# Patient Record
Sex: FEMALE | Race: WHITE | NOT HISPANIC OR LATINO | Employment: OTHER | URBAN - METROPOLITAN AREA
[De-identification: names, ages, dates, MRNs, and addresses within clinical notes are randomized per-mention and may not be internally consistent; named-entity substitution may affect disease eponyms.]

---

## 2017-07-12 ENCOUNTER — TRANSCRIBE ORDERS (OUTPATIENT)
Dept: ADMINISTRATIVE | Facility: HOSPITAL | Age: 77
End: 2017-07-12

## 2017-07-12 ENCOUNTER — HOSPITAL ENCOUNTER (OUTPATIENT)
Dept: GENERAL RADIOLOGY | Facility: HOSPITAL | Age: 77
Discharge: HOME | End: 2017-07-12
Admitting: NURSE PRACTITIONER

## 2017-07-12 DIAGNOSIS — G89.29 CHRONIC LEFT HIP PAIN: ICD-10-CM

## 2017-07-12 DIAGNOSIS — M25.552 CHRONIC LEFT HIP PAIN: ICD-10-CM

## 2017-07-12 DIAGNOSIS — M25.552 CHRONIC LEFT HIP PAIN: Primary | ICD-10-CM

## 2017-07-12 DIAGNOSIS — G89.29 CHRONIC LEFT HIP PAIN: Primary | ICD-10-CM

## 2017-07-12 PROCEDURE — 73502 X-RAY EXAM HIP UNI 2-3 VIEWS: CPT

## 2018-01-11 ENCOUNTER — TRANSCRIBE ORDERS (OUTPATIENT)
Dept: ADMINISTRATIVE | Facility: HOSPITAL | Age: 78
End: 2018-01-11

## 2018-01-11 ENCOUNTER — HOSPITAL ENCOUNTER (OUTPATIENT)
Dept: GENERAL RADIOLOGY | Facility: HOSPITAL | Age: 78
Discharge: HOME | End: 2018-01-11
Admitting: NURSE PRACTITIONER

## 2018-01-11 DIAGNOSIS — Z01.818 PREOP EXAMINATION: ICD-10-CM

## 2018-01-11 DIAGNOSIS — Z01.818 PREOP EXAMINATION: Primary | ICD-10-CM

## 2018-01-11 PROCEDURE — 71046 X-RAY EXAM CHEST 2 VIEWS: CPT

## 2019-04-02 ENCOUNTER — HOSPITAL ENCOUNTER (OUTPATIENT)
Dept: GENERAL RADIOLOGY | Facility: HOSPITAL | Age: 79
Discharge: HOME | End: 2019-04-02
Admitting: INTERNAL MEDICINE

## 2019-04-02 ENCOUNTER — TRANSCRIBE ORDERS (OUTPATIENT)
Dept: ADMINISTRATIVE | Facility: HOSPITAL | Age: 79
End: 2019-04-02

## 2019-04-02 DIAGNOSIS — M25.812 MASS OF JOINT OF LEFT SHOULDER: Primary | ICD-10-CM

## 2019-04-02 PROCEDURE — 73030 X-RAY EXAM OF SHOULDER: CPT

## 2022-04-18 ENCOUNTER — HOSPITAL ENCOUNTER (OUTPATIENT)
Dept: GENERAL RADIOLOGY | Facility: HOSPITAL | Age: 82
Discharge: HOME | End: 2022-04-18
Admitting: FAMILY MEDICINE

## 2022-04-18 ENCOUNTER — TRANSCRIBE ORDERS (OUTPATIENT)
Dept: ADMINISTRATIVE | Facility: HOSPITAL | Age: 82
End: 2022-04-18

## 2022-04-18 DIAGNOSIS — M54.2 CERVICALGIA: Primary | ICD-10-CM

## 2022-04-18 DIAGNOSIS — M54.2 CERVICALGIA: ICD-10-CM

## 2022-04-18 PROCEDURE — 72052 X-RAY EXAM NECK SPINE 6/>VWS: CPT

## 2025-02-14 ENCOUNTER — ANESTHESIA (OUTPATIENT)
Dept: PERIOP | Facility: HOSPITAL | Age: 85
End: 2025-02-14
Payer: MEDICARE

## 2025-02-14 ENCOUNTER — HOSPITAL ENCOUNTER (INPATIENT)
Facility: HOSPITAL | Age: 85
LOS: 4 days | Discharge: HOME | End: 2025-02-19
Attending: EMERGENCY MEDICINE | Admitting: INTERNAL MEDICINE
Payer: MEDICARE

## 2025-02-14 ENCOUNTER — APPOINTMENT (OUTPATIENT)
Facility: HOSPITAL | Age: 85
End: 2025-02-14
Payer: MEDICARE

## 2025-02-14 ENCOUNTER — ANESTHESIA EVENT (OUTPATIENT)
Dept: PERIOP | Facility: HOSPITAL | Age: 85
End: 2025-02-14
Payer: MEDICARE

## 2025-02-14 DIAGNOSIS — E87.6 HYPOKALEMIA: ICD-10-CM

## 2025-02-14 DIAGNOSIS — K45.8 INTERNAL HERNIA: Primary | ICD-10-CM

## 2025-02-14 DIAGNOSIS — K56.690 OTHER PARTIAL INTESTINAL OBSTRUCTION: ICD-10-CM

## 2025-02-14 DIAGNOSIS — K56.609 BOWEL OBSTRUCTION: ICD-10-CM

## 2025-02-14 LAB
ALBUMIN SERPL-MCNC: 3.7 G/DL (ref 3.5–5.2)
ALBUMIN/GLOB SERPL: 1.3 G/DL
ALP SERPL-CCNC: 50 U/L (ref 39–117)
ALT SERPL W P-5'-P-CCNC: <5 U/L (ref 1–33)
ANION GAP SERPL CALCULATED.3IONS-SCNC: 12.4 MMOL/L (ref 5–15)
AST SERPL-CCNC: 20 U/L (ref 1–32)
BASOPHILS # BLD AUTO: 0.02 10*3/MM3 (ref 0–0.2)
BASOPHILS NFR BLD AUTO: 0.3 % (ref 0–1.5)
BILIRUB SERPL-MCNC: <0.2 MG/DL (ref 0–1.2)
BILIRUB UR QL STRIP: NEGATIVE
BUN SERPL-MCNC: 19 MG/DL (ref 8–23)
BUN/CREAT SERPL: 17.4 (ref 7–25)
CALCIUM SPEC-SCNC: 8.7 MG/DL (ref 8.6–10.5)
CHLORIDE SERPL-SCNC: 98 MMOL/L (ref 98–107)
CLARITY UR: CLEAR
CO2 SERPL-SCNC: 21.6 MMOL/L (ref 22–29)
COLOR UR: YELLOW
CREAT SERPL-MCNC: 1.09 MG/DL (ref 0.57–1)
DEPRECATED RDW RBC AUTO: 45.3 FL (ref 37–54)
EGFRCR SERPLBLD CKD-EPI 2021: 50.5 ML/MIN/1.73
EOSINOPHIL # BLD AUTO: 0.04 10*3/MM3 (ref 0–0.4)
EOSINOPHIL NFR BLD AUTO: 0.7 % (ref 0.3–6.2)
ERYTHROCYTE [DISTWIDTH] IN BLOOD BY AUTOMATED COUNT: 12.9 % (ref 12.3–15.4)
FLUAV RNA RESP QL NAA+PROBE: NOT DETECTED
FLUBV RNA RESP QL NAA+PROBE: NOT DETECTED
GLOBULIN UR ELPH-MCNC: 2.8 GM/DL
GLUCOSE SERPL-MCNC: 122 MG/DL (ref 65–99)
GLUCOSE UR STRIP-MCNC: NEGATIVE MG/DL
HCT VFR BLD AUTO: 32.6 % (ref 34–46.6)
HGB BLD-MCNC: 10.6 G/DL (ref 12–15.9)
HGB UR QL STRIP.AUTO: NEGATIVE
IMM GRANULOCYTES # BLD AUTO: 0.01 10*3/MM3 (ref 0–0.05)
IMM GRANULOCYTES NFR BLD AUTO: 0.2 % (ref 0–0.5)
KETONES UR QL STRIP: NEGATIVE
LEUKOCYTE ESTERASE UR QL STRIP.AUTO: NEGATIVE
LIPASE SERPL-CCNC: 47 U/L (ref 13–60)
LYMPHOCYTES # BLD AUTO: 0.99 10*3/MM3 (ref 0.7–3.1)
LYMPHOCYTES NFR BLD AUTO: 16.7 % (ref 19.6–45.3)
MCH RBC QN AUTO: 30.7 PG (ref 26.6–33)
MCHC RBC AUTO-ENTMCNC: 32.5 G/DL (ref 31.5–35.7)
MCV RBC AUTO: 94.5 FL (ref 79–97)
MONOCYTES # BLD AUTO: 0.51 10*3/MM3 (ref 0.1–0.9)
MONOCYTES NFR BLD AUTO: 8.6 % (ref 5–12)
NEUTROPHILS NFR BLD AUTO: 4.36 10*3/MM3 (ref 1.7–7)
NEUTROPHILS NFR BLD AUTO: 73.5 % (ref 42.7–76)
NITRITE UR QL STRIP: NEGATIVE
PH UR STRIP.AUTO: 5.5 [PH] (ref 5–8)
PLATELET # BLD AUTO: 230 10*3/MM3 (ref 140–450)
PMV BLD AUTO: 8.8 FL (ref 6–12)
POTASSIUM SERPL-SCNC: 3.2 MMOL/L (ref 3.5–5.2)
PROT SERPL-MCNC: 6.5 G/DL (ref 6–8.5)
PROT UR QL STRIP: NEGATIVE
RBC # BLD AUTO: 3.45 10*6/MM3 (ref 3.77–5.28)
RSV RNA RESP QL NAA+PROBE: NOT DETECTED
SARS-COV-2 RNA RESP QL NAA+PROBE: NOT DETECTED
SODIUM SERPL-SCNC: 132 MMOL/L (ref 136–145)
SP GR UR STRIP: 1.01 (ref 1–1.03)
UROBILINOGEN UR QL STRIP: NORMAL
WBC NRBC COR # BLD AUTO: 5.93 10*3/MM3 (ref 3.4–10.8)

## 2025-02-14 PROCEDURE — 80053 COMPREHEN METABOLIC PANEL: CPT | Performed by: EMERGENCY MEDICINE

## 2025-02-14 PROCEDURE — 25510000001 IOPAMIDOL 61 % SOLUTION: Performed by: EMERGENCY MEDICINE

## 2025-02-14 PROCEDURE — 25010000002 MORPHINE PER 10 MG: Performed by: EMERGENCY MEDICINE

## 2025-02-14 PROCEDURE — 81003 URINALYSIS AUTO W/O SCOPE: CPT | Performed by: EMERGENCY MEDICINE

## 2025-02-14 PROCEDURE — 25810000003 LACTATED RINGERS SOLUTION: Performed by: EMERGENCY MEDICINE

## 2025-02-14 PROCEDURE — 25010000002 ONDANSETRON PER 1 MG: Performed by: EMERGENCY MEDICINE

## 2025-02-14 PROCEDURE — 25010000002 CEFAZOLIN IN DEXTROSE 2-4 GM/100ML-% SOLUTION: Performed by: ANESTHESIOLOGY

## 2025-02-14 PROCEDURE — 25010000002 SUCCINYLCHOLINE PER 20 MG: Performed by: ANESTHESIOLOGY

## 2025-02-14 PROCEDURE — 25010000002 PROPOFOL 10 MG/ML EMULSION: Performed by: ANESTHESIOLOGY

## 2025-02-14 PROCEDURE — 25010000002 POTASSIUM CHLORIDE 10 MEQ/100ML SOLUTION: Performed by: EMERGENCY MEDICINE

## 2025-02-14 PROCEDURE — 93005 ELECTROCARDIOGRAM TRACING: CPT | Performed by: EMERGENCY MEDICINE

## 2025-02-14 PROCEDURE — 25810000003 LACTATED RINGERS PER 1000 ML: Performed by: ANESTHESIOLOGY

## 2025-02-14 PROCEDURE — 25010000002 FENTANYL CITRATE (PF) 50 MCG/ML SOLUTION: Performed by: ANESTHESIOLOGY

## 2025-02-14 PROCEDURE — 85025 COMPLETE CBC W/AUTO DIFF WBC: CPT | Performed by: EMERGENCY MEDICINE

## 2025-02-14 PROCEDURE — 87637 SARSCOV2&INF A&B&RSV AMP PRB: CPT | Performed by: EMERGENCY MEDICINE

## 2025-02-14 PROCEDURE — 71045 X-RAY EXAM CHEST 1 VIEW: CPT

## 2025-02-14 PROCEDURE — 74177 CT ABD & PELVIS W/CONTRAST: CPT

## 2025-02-14 PROCEDURE — 36415 COLL VENOUS BLD VENIPUNCTURE: CPT

## 2025-02-14 PROCEDURE — 99285 EMERGENCY DEPT VISIT HI MDM: CPT

## 2025-02-14 PROCEDURE — 25010000002 LIDOCAINE 1 % SOLUTION: Performed by: ANESTHESIOLOGY

## 2025-02-14 PROCEDURE — 83690 ASSAY OF LIPASE: CPT | Performed by: EMERGENCY MEDICINE

## 2025-02-14 DEVICE — PROXIMATE RELOADABLE LINEAR STAPLER
Type: IMPLANTABLE DEVICE | Site: ABDOMEN | Status: FUNCTIONAL
Brand: PROXIMATE

## 2025-02-14 DEVICE — VIOLET ANTIBACTERIAL POLYDIOXANONE, KNOTLESS TISSUE CONTROL DEVICE
Type: IMPLANTABLE DEVICE | Site: ABDOMEN | Status: FUNCTIONAL
Brand: STRATAFIX

## 2025-02-14 DEVICE — PROXIMATE LINEAR CUTTER RELOAD, BLUE, 75MM
Type: IMPLANTABLE DEVICE | Site: ABDOMEN | Status: FUNCTIONAL
Brand: PROXIMATE

## 2025-02-14 DEVICE — PROXIMATE RELOADABLE LINEAR CUTTER WITH SAFETY LOCK-OUT, 75MM
Type: IMPLANTABLE DEVICE | Site: ABDOMEN | Status: FUNCTIONAL
Brand: PROXIMATE

## 2025-02-14 RX ORDER — ONDANSETRON 2 MG/ML
4 INJECTION INTRAMUSCULAR; INTRAVENOUS ONCE
Status: COMPLETED | OUTPATIENT
Start: 2025-02-14 | End: 2025-02-14

## 2025-02-14 RX ORDER — LIDOCAINE HYDROCHLORIDE 10 MG/ML
INJECTION, SOLUTION INFILTRATION; PERINEURAL AS NEEDED
Status: DISCONTINUED | OUTPATIENT
Start: 2025-02-14 | End: 2025-02-15 | Stop reason: SURG

## 2025-02-14 RX ORDER — IOPAMIDOL 612 MG/ML
100 INJECTION, SOLUTION INTRAVASCULAR
Status: COMPLETED | OUTPATIENT
Start: 2025-02-14 | End: 2025-02-14

## 2025-02-14 RX ORDER — CEFAZOLIN SODIUM 2 G/100ML
INJECTION, SOLUTION INTRAVENOUS AS NEEDED
Status: DISCONTINUED | OUTPATIENT
Start: 2025-02-14 | End: 2025-02-15 | Stop reason: SURG

## 2025-02-14 RX ORDER — ROCURONIUM BROMIDE 10 MG/ML
INJECTION, SOLUTION INTRAVENOUS AS NEEDED
Status: DISCONTINUED | OUTPATIENT
Start: 2025-02-14 | End: 2025-02-15 | Stop reason: SURG

## 2025-02-14 RX ORDER — FENTANYL CITRATE 50 UG/ML
INJECTION, SOLUTION INTRAMUSCULAR; INTRAVENOUS AS NEEDED
Status: DISCONTINUED | OUTPATIENT
Start: 2025-02-14 | End: 2025-02-15 | Stop reason: SURG

## 2025-02-14 RX ORDER — SODIUM CHLORIDE 9 MG/ML
125 INJECTION, SOLUTION INTRAVENOUS CONTINUOUS
Status: DISCONTINUED | OUTPATIENT
Start: 2025-02-14 | End: 2025-02-15

## 2025-02-14 RX ORDER — PROPOFOL 10 MG/ML
VIAL (ML) INTRAVENOUS AS NEEDED
Status: DISCONTINUED | OUTPATIENT
Start: 2025-02-14 | End: 2025-02-15 | Stop reason: SURG

## 2025-02-14 RX ORDER — SUCCINYLCHOLINE CHLORIDE 20 MG/ML
INJECTION INTRAMUSCULAR; INTRAVENOUS AS NEEDED
Status: DISCONTINUED | OUTPATIENT
Start: 2025-02-14 | End: 2025-02-15 | Stop reason: SURG

## 2025-02-14 RX ORDER — SODIUM CHLORIDE 0.9 % (FLUSH) 0.9 %
10 SYRINGE (ML) INJECTION AS NEEDED
Status: DISCONTINUED | OUTPATIENT
Start: 2025-02-14 | End: 2025-02-15

## 2025-02-14 RX ORDER — POTASSIUM CHLORIDE 7.45 MG/ML
10 INJECTION INTRAVENOUS ONCE
Status: COMPLETED | OUTPATIENT
Start: 2025-02-14 | End: 2025-02-14

## 2025-02-14 RX ADMIN — PROPOFOL 120 MG: 10 INJECTION, EMULSION INTRAVENOUS at 23:41

## 2025-02-14 RX ADMIN — SODIUM CHLORIDE, SODIUM LACTATE, POTASSIUM CHLORIDE, AND CALCIUM CHLORIDE 1000 ML: 600; 310; 30; 20 INJECTION, SOLUTION INTRAVENOUS at 21:22

## 2025-02-14 RX ADMIN — ROCURONIUM BROMIDE 5 MG: 10 INJECTION INTRAVENOUS at 23:40

## 2025-02-14 RX ADMIN — IOPAMIDOL 75 ML: 612 INJECTION, SOLUTION INTRAVENOUS at 20:51

## 2025-02-14 RX ADMIN — LIDOCAINE HYDROCHLORIDE 50 MG: 10 INJECTION, SOLUTION INFILTRATION; PERINEURAL at 23:41

## 2025-02-14 RX ADMIN — POTASSIUM CHLORIDE 10 MEQ: 7.46 INJECTION, SOLUTION INTRAVENOUS at 22:23

## 2025-02-14 RX ADMIN — SODIUM CHLORIDE, POTASSIUM CHLORIDE, SODIUM LACTATE AND CALCIUM CHLORIDE: 600; 310; 30; 20 INJECTION, SOLUTION INTRAVENOUS at 23:35

## 2025-02-14 RX ADMIN — ONDANSETRON 4 MG: 2 INJECTION INTRAMUSCULAR; INTRAVENOUS at 21:25

## 2025-02-14 RX ADMIN — ROCURONIUM BROMIDE 30 MG: 10 INJECTION INTRAVENOUS at 23:47

## 2025-02-14 RX ADMIN — MORPHINE SULFATE 4 MG: 4 INJECTION, SOLUTION INTRAMUSCULAR; INTRAVENOUS at 21:21

## 2025-02-14 RX ADMIN — SUCCINYLCHOLINE CHLORIDE 100 MG: 20 INJECTION, SOLUTION INTRAMUSCULAR; INTRAVENOUS at 23:41

## 2025-02-14 RX ADMIN — FENTANYL CITRATE 100 MCG: 50 INJECTION, SOLUTION INTRAMUSCULAR; INTRAVENOUS at 23:40

## 2025-02-14 RX ADMIN — MORPHINE SULFATE 4 MG: 4 INJECTION, SOLUTION INTRAMUSCULAR; INTRAVENOUS at 22:19

## 2025-02-14 RX ADMIN — CEFAZOLIN SODIUM 2 G: 2 INJECTION, SOLUTION INTRAVENOUS at 23:45

## 2025-02-15 PROBLEM — K45.8 INTERNAL HERNIA: Status: ACTIVE | Noted: 2025-02-15

## 2025-02-15 LAB
ALBUMIN SERPL-MCNC: 3.2 G/DL (ref 3.5–5.2)
ALBUMIN/GLOB SERPL: 1.9 G/DL
ALP SERPL-CCNC: 60 U/L (ref 39–117)
ALT SERPL W P-5'-P-CCNC: 296 U/L (ref 1–33)
ANION GAP SERPL CALCULATED.3IONS-SCNC: 9 MMOL/L (ref 5–15)
AST SERPL-CCNC: 511 U/L (ref 1–32)
BASOPHILS # BLD AUTO: 0.02 10*3/MM3 (ref 0–0.2)
BASOPHILS NFR BLD AUTO: 0.3 % (ref 0–1.5)
BILIRUB SERPL-MCNC: 0.2 MG/DL (ref 0–1.2)
BUN SERPL-MCNC: 18 MG/DL (ref 8–23)
BUN/CREAT SERPL: 20 (ref 7–25)
CALCIUM SPEC-SCNC: 8.3 MG/DL (ref 8.6–10.5)
CHLORIDE SERPL-SCNC: 102 MMOL/L (ref 98–107)
CO2 SERPL-SCNC: 24 MMOL/L (ref 22–29)
CREAT SERPL-MCNC: 0.9 MG/DL (ref 0.57–1)
D-LACTATE SERPL-SCNC: 1.8 MMOL/L (ref 0.5–2)
D-LACTATE SERPL-SCNC: 2.1 MMOL/L (ref 0.5–2)
D-LACTATE SERPL-SCNC: 2.6 MMOL/L (ref 0.5–2)
DEPRECATED RDW RBC AUTO: 46.3 FL (ref 37–54)
EGFRCR SERPLBLD CKD-EPI 2021: 63.6 ML/MIN/1.73
EOSINOPHIL # BLD AUTO: 0 10*3/MM3 (ref 0–0.4)
EOSINOPHIL NFR BLD AUTO: 0 % (ref 0.3–6.2)
ERYTHROCYTE [DISTWIDTH] IN BLOOD BY AUTOMATED COUNT: 13 % (ref 12.3–15.4)
GLOBULIN UR ELPH-MCNC: 1.7 GM/DL
GLUCOSE SERPL-MCNC: 162 MG/DL (ref 65–99)
HCT VFR BLD AUTO: 32.1 % (ref 34–46.6)
HGB BLD-MCNC: 10.1 G/DL (ref 12–15.9)
IMM GRANULOCYTES # BLD AUTO: 0.01 10*3/MM3 (ref 0–0.05)
IMM GRANULOCYTES NFR BLD AUTO: 0.1 % (ref 0–0.5)
LYMPHOCYTES # BLD AUTO: 0.24 10*3/MM3 (ref 0.7–3.1)
LYMPHOCYTES NFR BLD AUTO: 3.4 % (ref 19.6–45.3)
MAGNESIUM SERPL-MCNC: 1.7 MG/DL (ref 1.6–2.4)
MCH RBC QN AUTO: 30.7 PG (ref 26.6–33)
MCHC RBC AUTO-ENTMCNC: 31.5 G/DL (ref 31.5–35.7)
MCV RBC AUTO: 97.6 FL (ref 79–97)
MONOCYTES # BLD AUTO: 0.48 10*3/MM3 (ref 0.1–0.9)
MONOCYTES NFR BLD AUTO: 6.9 % (ref 5–12)
NEUTROPHILS NFR BLD AUTO: 6.23 10*3/MM3 (ref 1.7–7)
NEUTROPHILS NFR BLD AUTO: 89.3 % (ref 42.7–76)
NRBC BLD AUTO-RTO: 0 /100 WBC (ref 0–0.2)
PHOSPHATE SERPL-MCNC: 3.6 MG/DL (ref 2.5–4.5)
PLATELET # BLD AUTO: 229 10*3/MM3 (ref 140–450)
PMV BLD AUTO: 9.4 FL (ref 6–12)
POTASSIUM SERPL-SCNC: 4.1 MMOL/L (ref 3.5–5.2)
PROT SERPL-MCNC: 4.9 G/DL (ref 6–8.5)
RBC # BLD AUTO: 3.29 10*6/MM3 (ref 3.77–5.28)
SODIUM SERPL-SCNC: 135 MMOL/L (ref 136–145)
WBC NRBC COR # BLD AUTO: 6.98 10*3/MM3 (ref 3.4–10.8)

## 2025-02-15 PROCEDURE — 0DBG0ZZ EXCISION OF LEFT LARGE INTESTINE, OPEN APPROACH: ICD-10-PCS | Performed by: STUDENT IN AN ORGANIZED HEALTH CARE EDUCATION/TRAINING PROGRAM

## 2025-02-15 PROCEDURE — 25010000002 ONDANSETRON PER 1 MG: Performed by: FAMILY MEDICINE

## 2025-02-15 PROCEDURE — 97162 PT EVAL MOD COMPLEX 30 MIN: CPT

## 2025-02-15 PROCEDURE — 25010000002 HYDROMORPHONE PER 4 MG: Performed by: STUDENT IN AN ORGANIZED HEALTH CARE EDUCATION/TRAINING PROGRAM

## 2025-02-15 PROCEDURE — 25010000002 ONDANSETRON PER 1 MG: Performed by: ANESTHESIOLOGY

## 2025-02-15 PROCEDURE — 99223 1ST HOSP IP/OBS HIGH 75: CPT | Performed by: FAMILY MEDICINE

## 2025-02-15 PROCEDURE — 84100 ASSAY OF PHOSPHORUS: CPT | Performed by: FAMILY MEDICINE

## 2025-02-15 PROCEDURE — 83605 ASSAY OF LACTIC ACID: CPT | Performed by: EMERGENCY MEDICINE

## 2025-02-15 PROCEDURE — 80053 COMPREHEN METABOLIC PANEL: CPT | Performed by: FAMILY MEDICINE

## 2025-02-15 PROCEDURE — 0DNL0ZZ RELEASE TRANSVERSE COLON, OPEN APPROACH: ICD-10-PCS | Performed by: STUDENT IN AN ORGANIZED HEALTH CARE EDUCATION/TRAINING PROGRAM

## 2025-02-15 PROCEDURE — 85025 COMPLETE CBC W/AUTO DIFF WBC: CPT | Performed by: STUDENT IN AN ORGANIZED HEALTH CARE EDUCATION/TRAINING PROGRAM

## 2025-02-15 PROCEDURE — 25010000002 FENTANYL CITRATE (PF) 50 MCG/ML SOLUTION

## 2025-02-15 PROCEDURE — 25010000002 PROCHLORPERAZINE 10 MG/2ML SOLUTION: Performed by: NURSE PRACTITIONER

## 2025-02-15 PROCEDURE — 25010000002 SUGAMMADEX 200 MG/2ML SOLUTION: Performed by: ANESTHESIOLOGY

## 2025-02-15 PROCEDURE — 83735 ASSAY OF MAGNESIUM: CPT | Performed by: FAMILY MEDICINE

## 2025-02-15 PROCEDURE — 88307 TISSUE EXAM BY PATHOLOGIST: CPT | Performed by: STUDENT IN AN ORGANIZED HEALTH CARE EDUCATION/TRAINING PROGRAM

## 2025-02-15 PROCEDURE — 0DBU0ZZ EXCISION OF OMENTUM, OPEN APPROACH: ICD-10-PCS | Performed by: STUDENT IN AN ORGANIZED HEALTH CARE EDUCATION/TRAINING PROGRAM

## 2025-02-15 RX ORDER — LISINOPRIL 10 MG/1
1 TABLET ORAL AS NEEDED
COMMUNITY
Start: 2024-06-06

## 2025-02-15 RX ORDER — NITROGLYCERIN 0.4 MG/1
0.4 TABLET SUBLINGUAL
Status: DISCONTINUED | OUTPATIENT
Start: 2025-02-15 | End: 2025-02-19 | Stop reason: HOSPADM

## 2025-02-15 RX ORDER — PANTOPRAZOLE SODIUM 40 MG/1
40 TABLET, DELAYED RELEASE ORAL
Status: DISCONTINUED | OUTPATIENT
Start: 2025-02-15 | End: 2025-02-19 | Stop reason: HOSPADM

## 2025-02-15 RX ORDER — MAGNESIUM HYDROXIDE 1200 MG/15ML
LIQUID ORAL AS NEEDED
Status: DISCONTINUED | OUTPATIENT
Start: 2025-02-15 | End: 2025-02-15 | Stop reason: HOSPADM

## 2025-02-15 RX ORDER — ZOLPIDEM TARTRATE 5 MG/1
5 TABLET ORAL NIGHTLY PRN
Status: DISCONTINUED | OUTPATIENT
Start: 2025-02-15 | End: 2025-02-19 | Stop reason: HOSPADM

## 2025-02-15 RX ORDER — FAMOTIDINE 20 MG/1
20 TABLET, FILM COATED ORAL ONCE
Status: CANCELLED | OUTPATIENT
Start: 2025-02-15 | End: 2025-02-15

## 2025-02-15 RX ORDER — MIDAZOLAM HYDROCHLORIDE 1 MG/ML
0.5 INJECTION, SOLUTION INTRAMUSCULAR; INTRAVENOUS
Status: CANCELLED | OUTPATIENT
Start: 2025-02-15

## 2025-02-15 RX ORDER — LIDOCAINE HYDROCHLORIDE 10 MG/ML
0.5 INJECTION, SOLUTION EPIDURAL; INFILTRATION; INTRACAUDAL; PERINEURAL ONCE AS NEEDED
Status: CANCELLED | OUTPATIENT
Start: 2025-02-15

## 2025-02-15 RX ORDER — SODIUM CHLORIDE 0.9 % (FLUSH) 0.9 %
10 SYRINGE (ML) INJECTION AS NEEDED
Status: CANCELLED | OUTPATIENT
Start: 2025-02-15

## 2025-02-15 RX ORDER — FLUOXETINE HYDROCHLORIDE 40 MG/1
1 CAPSULE ORAL DAILY
COMMUNITY
Start: 2024-07-10

## 2025-02-15 RX ORDER — ACETAMINOPHEN 10 MG/ML
1000 INJECTION, SOLUTION INTRAVENOUS EVERY 8 HOURS PRN
Status: DISCONTINUED | OUTPATIENT
Start: 2025-02-15 | End: 2025-02-16

## 2025-02-15 RX ORDER — ONDANSETRON 4 MG/1
4 TABLET, ORALLY DISINTEGRATING ORAL EVERY 6 HOURS PRN
Status: DISCONTINUED | OUTPATIENT
Start: 2025-02-15 | End: 2025-02-19 | Stop reason: HOSPADM

## 2025-02-15 RX ORDER — SODIUM CHLORIDE, SODIUM LACTATE, POTASSIUM CHLORIDE, CALCIUM CHLORIDE 600; 310; 30; 20 MG/100ML; MG/100ML; MG/100ML; MG/100ML
9 INJECTION, SOLUTION INTRAVENOUS CONTINUOUS
Status: CANCELLED | OUTPATIENT
Start: 2025-02-16 | End: 2025-02-16

## 2025-02-15 RX ORDER — SODIUM CHLORIDE, SODIUM LACTATE, POTASSIUM CHLORIDE, CALCIUM CHLORIDE 600; 310; 30; 20 MG/100ML; MG/100ML; MG/100ML; MG/100ML
INJECTION, SOLUTION INTRAVENOUS CONTINUOUS PRN
Status: DISCONTINUED | OUTPATIENT
Start: 2025-02-14 | End: 2025-02-15 | Stop reason: SURG

## 2025-02-15 RX ORDER — ACETAMINOPHEN 650 MG/1
650 SUPPOSITORY RECTAL EVERY 4 HOURS PRN
Status: DISCONTINUED | OUTPATIENT
Start: 2025-02-15 | End: 2025-02-15 | Stop reason: SDUPTHER

## 2025-02-15 RX ORDER — NALOXONE HCL 0.4 MG/ML
0.4 VIAL (ML) INJECTION
Status: DISCONTINUED | OUTPATIENT
Start: 2025-02-15 | End: 2025-02-18

## 2025-02-15 RX ORDER — ASPIRIN 81 MG/1
81 TABLET ORAL DAILY
Status: DISCONTINUED | OUTPATIENT
Start: 2025-02-15 | End: 2025-02-19 | Stop reason: HOSPADM

## 2025-02-15 RX ORDER — ACETAMINOPHEN 160 MG/5ML
650 SOLUTION ORAL EVERY 4 HOURS PRN
Status: DISCONTINUED | OUTPATIENT
Start: 2025-02-15 | End: 2025-02-15 | Stop reason: SDUPTHER

## 2025-02-15 RX ORDER — FAMOTIDINE 10 MG/ML
20 INJECTION, SOLUTION INTRAVENOUS ONCE
Status: CANCELLED | OUTPATIENT
Start: 2025-02-15 | End: 2025-02-15

## 2025-02-15 RX ORDER — FENTANYL CITRATE 50 UG/ML
50 INJECTION, SOLUTION INTRAMUSCULAR; INTRAVENOUS
Status: DISCONTINUED | OUTPATIENT
Start: 2025-02-15 | End: 2025-02-15 | Stop reason: HOSPADM

## 2025-02-15 RX ORDER — ONDANSETRON 4 MG/1
4 TABLET, ORALLY DISINTEGRATING ORAL EVERY 6 HOURS PRN
Status: DISCONTINUED | OUTPATIENT
Start: 2025-02-15 | End: 2025-02-18

## 2025-02-15 RX ORDER — ASPIRIN 81 MG/1
81 TABLET ORAL DAILY
COMMUNITY

## 2025-02-15 RX ORDER — SODIUM CHLORIDE 9 MG/ML
40 INJECTION, SOLUTION INTRAVENOUS AS NEEDED
Status: DISCONTINUED | OUTPATIENT
Start: 2025-02-15 | End: 2025-02-19 | Stop reason: HOSPADM

## 2025-02-15 RX ORDER — ENOXAPARIN SODIUM 100 MG/ML
30 INJECTION SUBCUTANEOUS DAILY
Status: DISCONTINUED | OUTPATIENT
Start: 2025-02-16 | End: 2025-02-19 | Stop reason: HOSPADM

## 2025-02-15 RX ORDER — LISINOPRIL 10 MG/1
10 TABLET ORAL
Status: DISCONTINUED | OUTPATIENT
Start: 2025-02-15 | End: 2025-02-19 | Stop reason: HOSPADM

## 2025-02-15 RX ORDER — ZOLPIDEM TARTRATE 10 MG/1
10 TABLET ORAL NIGHTLY
COMMUNITY

## 2025-02-15 RX ORDER — ONDANSETRON 2 MG/ML
4 INJECTION INTRAMUSCULAR; INTRAVENOUS EVERY 6 HOURS PRN
Status: DISCONTINUED | OUTPATIENT
Start: 2025-02-15 | End: 2025-02-18

## 2025-02-15 RX ORDER — ACETAMINOPHEN 325 MG/1
650 TABLET ORAL EVERY 4 HOURS PRN
Status: DISCONTINUED | OUTPATIENT
Start: 2025-02-15 | End: 2025-02-15 | Stop reason: SDUPTHER

## 2025-02-15 RX ORDER — PROCHLORPERAZINE EDISYLATE 5 MG/ML
2.5 INJECTION INTRAMUSCULAR; INTRAVENOUS ONCE
Status: COMPLETED | OUTPATIENT
Start: 2025-02-15 | End: 2025-02-15

## 2025-02-15 RX ORDER — SODIUM CHLORIDE 0.9 % (FLUSH) 0.9 %
10 SYRINGE (ML) INJECTION AS NEEDED
Status: DISCONTINUED | OUTPATIENT
Start: 2025-02-15 | End: 2025-02-19 | Stop reason: HOSPADM

## 2025-02-15 RX ORDER — NITROGLYCERIN 0.4 MG/1
0.4 TABLET SUBLINGUAL
Status: DISCONTINUED | OUTPATIENT
Start: 2025-02-15 | End: 2025-02-18 | Stop reason: SDUPTHER

## 2025-02-15 RX ORDER — SODIUM CHLORIDE 0.9 % (FLUSH) 0.9 %
10 SYRINGE (ML) INJECTION EVERY 12 HOURS SCHEDULED
Status: DISCONTINUED | OUTPATIENT
Start: 2025-02-15 | End: 2025-02-19 | Stop reason: HOSPADM

## 2025-02-15 RX ORDER — HYDROMORPHONE HYDROCHLORIDE 1 MG/ML
0.5 INJECTION, SOLUTION INTRAMUSCULAR; INTRAVENOUS; SUBCUTANEOUS
Status: DISCONTINUED | OUTPATIENT
Start: 2025-02-15 | End: 2025-02-18

## 2025-02-15 RX ORDER — ONDANSETRON 2 MG/ML
INJECTION INTRAMUSCULAR; INTRAVENOUS AS NEEDED
Status: DISCONTINUED | OUTPATIENT
Start: 2025-02-15 | End: 2025-02-15 | Stop reason: SURG

## 2025-02-15 RX ORDER — SODIUM CHLORIDE 0.9 % (FLUSH) 0.9 %
10 SYRINGE (ML) INJECTION EVERY 12 HOURS SCHEDULED
Status: CANCELLED | OUTPATIENT
Start: 2025-02-15

## 2025-02-15 RX ORDER — FENTANYL CITRATE 50 UG/ML
INJECTION, SOLUTION INTRAMUSCULAR; INTRAVENOUS
Status: COMPLETED
Start: 2025-02-15 | End: 2025-02-15

## 2025-02-15 RX ORDER — OMEPRAZOLE 40 MG/1
40 CAPSULE, DELAYED RELEASE ORAL DAILY
COMMUNITY
Start: 2024-06-06

## 2025-02-15 RX ORDER — ONDANSETRON 2 MG/ML
4 INJECTION INTRAMUSCULAR; INTRAVENOUS ONCE AS NEEDED
Status: DISCONTINUED | OUTPATIENT
Start: 2025-02-15 | End: 2025-02-15 | Stop reason: HOSPADM

## 2025-02-15 RX ORDER — ONDANSETRON 2 MG/ML
4 INJECTION INTRAMUSCULAR; INTRAVENOUS EVERY 6 HOURS PRN
Status: DISCONTINUED | OUTPATIENT
Start: 2025-02-15 | End: 2025-02-19 | Stop reason: HOSPADM

## 2025-02-15 RX ORDER — HYDROMORPHONE HYDROCHLORIDE 1 MG/ML
0.5 INJECTION, SOLUTION INTRAMUSCULAR; INTRAVENOUS; SUBCUTANEOUS
Status: DISCONTINUED | OUTPATIENT
Start: 2025-02-15 | End: 2025-02-15 | Stop reason: HOSPADM

## 2025-02-15 RX ORDER — DEXTROSE MONOHYDRATE, SODIUM CHLORIDE, AND POTASSIUM CHLORIDE 50; 1.49; 4.5 G/1000ML; G/1000ML; G/1000ML
75 INJECTION, SOLUTION INTRAVENOUS CONTINUOUS
Status: DISPENSED | OUTPATIENT
Start: 2025-02-15 | End: 2025-02-17

## 2025-02-15 RX ADMIN — ONDANSETRON 4 MG: 2 INJECTION INTRAMUSCULAR; INTRAVENOUS at 16:34

## 2025-02-15 RX ADMIN — ROCURONIUM BROMIDE 20 MG: 10 INJECTION INTRAVENOUS at 00:58

## 2025-02-15 RX ADMIN — HYDROMORPHONE HYDROCHLORIDE 0.5 MG: 1 INJECTION, SOLUTION INTRAMUSCULAR; INTRAVENOUS; SUBCUTANEOUS at 10:52

## 2025-02-15 RX ADMIN — HYDROMORPHONE HYDROCHLORIDE 0.5 MG: 1 INJECTION, SOLUTION INTRAMUSCULAR; INTRAVENOUS; SUBCUTANEOUS at 20:43

## 2025-02-15 RX ADMIN — ONDANSETRON 4 MG: 2 INJECTION INTRAMUSCULAR; INTRAVENOUS at 06:09

## 2025-02-15 RX ADMIN — HYDROMORPHONE HYDROCHLORIDE 0.5 MG: 1 INJECTION, SOLUTION INTRAMUSCULAR; INTRAVENOUS; SUBCUTANEOUS at 02:32

## 2025-02-15 RX ADMIN — Medication 10 ML: at 02:32

## 2025-02-15 RX ADMIN — DEXTROSE MONOHYDRATE, SODIUM CHLORIDE, AND POTASSIUM CHLORIDE 75 ML/HR: 50; 1.49; 4.5 INJECTION, SOLUTION INTRAVENOUS at 02:31

## 2025-02-15 RX ADMIN — FENTANYL CITRATE 50 MCG: 50 INJECTION, SOLUTION INTRAMUSCULAR; INTRAVENOUS at 01:40

## 2025-02-15 RX ADMIN — PROCHLORPERAZINE EDISYLATE 2.5 MG: 5 INJECTION INTRAMUSCULAR; INTRAVENOUS at 03:17

## 2025-02-15 RX ADMIN — DEXTROSE MONOHYDRATE, SODIUM CHLORIDE, AND POTASSIUM CHLORIDE 75 ML/HR: 50; 1.49; 4.5 INJECTION, SOLUTION INTRAVENOUS at 16:07

## 2025-02-15 RX ADMIN — HYDROMORPHONE HYDROCHLORIDE 0.5 MG: 1 INJECTION, SOLUTION INTRAMUSCULAR; INTRAVENOUS; SUBCUTANEOUS at 16:09

## 2025-02-15 RX ADMIN — SUGAMMADEX 200 MG: 100 INJECTION, SOLUTION INTRAVENOUS at 01:03

## 2025-02-15 RX ADMIN — ONDANSETRON 4 MG: 2 INJECTION INTRAMUSCULAR; INTRAVENOUS at 22:40

## 2025-02-15 RX ADMIN — HYDROMORPHONE HYDROCHLORIDE 0.5 MG: 1 INJECTION, SOLUTION INTRAMUSCULAR; INTRAVENOUS; SUBCUTANEOUS at 06:10

## 2025-02-15 RX ADMIN — ONDANSETRON 4 MG: 2 INJECTION INTRAMUSCULAR; INTRAVENOUS at 01:03

## 2025-02-15 RX ADMIN — PANTOPRAZOLE SODIUM 40 MG: 40 TABLET, DELAYED RELEASE ORAL at 06:03

## 2025-02-15 NOTE — ED NOTES
" Rebeca Mondragon    Nursing Report ED to Floor:  Mental status: X4  Ambulatory status: Assisst  Oxygen Therapy:  Roon air  Cardiac Rhythm: NSR  Admitted from: Home  Safety Concerns:  None  Precautions: None  Social Issues: None  ED Room #:  10    ED Nurse Phone Extension - [phone] or may call [phone].      HPI:   Chief Complaint   Patient presents with    Abdominal Pain       Past Medical History:  History reviewed. No pertinent past medical history.     Past Surgical History:  History reviewed. No pertinent surgical history.     Admitting Doctor:   No admitting provider for patient encounter.    Consulting Provider(s):  Consults       No orders found from 1/16/2025 to 2/15/2025.             Admitting Diagnosis:   The primary encounter diagnosis was Other partial intestinal obstruction. Diagnoses of Internal hernia and Hypokalemia were also pertinent to this visit.    Most Recent Vitals:   Vitals:    02/14/25 2031 02/14/25 2118 02/14/25 2145   BP: 130/65     BP Location: Right arm     Patient Position: Sitting     Pulse: 74  73   Resp: 16     Temp:  98 °F (36.7 °C)    TempSrc:  Oral    SpO2: 100%     Weight: 52.2 kg (115 lb)     Height: 160 cm (63\")         Active LDAs/IV Access:   Lines, Drains & Airways       Active LDAs       Name Placement date Placement time Site Days    Peripheral IV 02/14/25 2025 Left Antecubital 02/14/25 2025  Antecubital  less than 1                    Labs (abnormal labs have a star):   Labs Reviewed   COMPREHENSIVE METABOLIC PANEL - Abnormal; Notable for the following components:       Result Value    Glucose 122 (*)     Creatinine 1.09 (*)     Sodium 132 (*)     Potassium 3.2 (*)     CO2 21.6 (*)     eGFR 50.5 (*)     All other components within normal limits    Narrative:     GFR Categories in Chronic Kidney Disease (CKD)      GFR Category          GFR (mL/min/1.73)    Interpretation  G1                     90 or greater         Normal or high (1)  G2                      60-89          "       Mild decrease (1)  G3a                   45-59                Mild to moderate decrease  G3b                   30-44                Moderate to severe decrease  G4                    15-29                Severe decrease  G5                    14 or less           Kidney failure          (1)In the absence of evidence of kidney disease, neither GFR category G1 or G2 fulfill the criteria for CKD.    eGFR calculation 2021 CKD-EPI creatinine equation, which does not include race as a factor   CBC WITH AUTO DIFFERENTIAL - Abnormal; Notable for the following components:    RBC 3.45 (*)     Hemoglobin 10.6 (*)     Hematocrit 32.6 (*)     Lymphocyte % 16.7 (*)     All other components within normal limits   LIPASE - Normal   URINALYSIS W/ MICROSCOPIC IF INDICATED (NO CULTURE) - Normal    Narrative:     Urine microscopic not indicated.   COVID-19/FLUA&B/RSV, NP SWAB IN TRANSPORT MEDIA 1 HR TAT   LACTIC ACID, PLASMA   CBC AND DIFFERENTIAL    Narrative:     The following orders were created for panel order CBC & Differential.  Procedure                               Abnormality         Status                     ---------                               -----------         ------                     CBC Auto Differential[[id number]]         Abnormal            Final result                 Please view results for these tests on the individual orders.       Meds Given in ED:   Medications   sodium chloride 0.9 % flush 10 mL (has no administration in time range)   sodium chloride 0.9 % infusion (has no administration in time range)   morphine injection 4 mg (has no administration in time range)   benzocaine (HURRICAINE) 20 % liquid solution 1 spray (has no administration in time range)   potassium chloride 10 mEq in 100 mL IVPB (has no administration in time range)   lactated ringers bolus 1,000 mL (1,000 mL Intravenous New Bag 2/14/25 2122)   ondansetron (ZOFRAN) injection 4 mg (4 mg Intravenous Given 2/14/25 2125)   morphine  injection 4 mg (4 mg Intravenous Given 2/14/25 2121)   iopamidol (ISOVUE-300) 61 % injection 100 mL (75 mL Intravenous Given 2/14/25 2051)     sodium chloride, 125 mL/hr         Last NIH score:                                                          Dysphagia screening results:        [place] Coma Scale:  No data recorded     CIWA:        Restraint Type:            Isolation Status:  No active isolations

## 2025-02-15 NOTE — H&P
[facility] Medicine Services  HISTORY AND PHYSICAL    Patient Name: Rebeca Mondragon  : 1940  MRN: [id number]  Primary Care Physician: Chris Wilcox MD  Date of admission: 2025      Subjective   Subjective     Chief Complaint:  Abdominal pain    HPI:  Rebeca Mondragon is a 84 y.o. female past medical history of hypertension and depression who presented to [facility] with several hours of sudden onset generalized crampy abdominal pain characterized as severe and constant.  Worse with movement.  Associated with nausea and vomiting.  Last bowel movement was prior to this episode and was diarrhea.  Imaging at outside hospital with internal hernia in the right central pelvis resulting in small bowel obstruction.  Case was discussed with on-call general surgery, Dr. Hi and patient was brought to [facility] for surgical evaluation.  Patient was taken immediately to the operating room.  She was found to have multiple bands of adhesions in the right lower quadrant and pelvis serving as a lead point for volvulus of both the small intestine and cecum.  This caused an internal hernia of small bowel which was hemorrhagic and partially ischemic with areas of patchy necrosis.  This was resected to healthy bowel.  NG tube remains in place.  Patient seen in PACU and is currently resting comfortably, awakens easily.  Hospital medicine asked to be attending provider.    Personal History     Past Medical History:   Diagnosis Date    Depression     Hypertension            Past Surgical History:   Procedure Laterality Date    APPENDECTOMY      CHOLECYSTECTOMY      JOINT REPLACEMENT      TUBAL ABDOMINAL LIGATION         Family History: family history is not on file.     Social History:    Social History     Social History Narrative    Not on file       Medications:  Available home medication information reviewed.       Allergies   Allergen Reactions     Bactrim [Sulfamethoxazole-Trimethoprim] Unknown - Low Severity    Statins Unknown - Low Severity       Objective   Objective     Vital Signs:   Temp:  [97.6 °F (36.4 °C)-98.8 °F (37.1 °C)] 98.8 °F (37.1 °C)  Heart Rate:  [73-93] 93  Resp:  [12-20] 16  BP: (130-172)/(65-88) 136/68  Flow (L/min) (Oxygen Therapy):  [2-4] 2       Physical Exam   Constitutional: Awakens easily, elderly female   Eyes: PERRLA, sclerae anicteric, no conjunctival injection  HENT: NCAT, mucous membranes moist, NG in place  Neck: Supple, no thyromegaly, no lymphadenopathy, trachea midline  Respiratory: Clear to auscultation bilaterally, nonlabored respirations 2L NC  Cardiovascular: RRR, no murmurs, rubs, or gallops, palpable pedal pulses bilaterally  Gastrointestinal: Positive bowel sounds, soft,appropriate tenderness along incisions   : FC in place  Musculoskeletal: No bilateral ankle edema, no clubbing or cyanosis to extremities  Psychiatric: Appropriate affect, cooperative  Neurologic: No focal deficits   Skin: dressing in place    Result Review:  I have personally reviewed the results from the time of this admission to 2/15/2025 02:04 EST and agree with these findings:  [x]  Laboratory list / accordion  []  Microbiology  [x]  Radiology  []  EKG/Telemetry   []  Cardiology/Vascular   []  Pathology  []  Old records  []  Other:      LAB RESULTS:      Lab 02/14/25 2118   WBC 5.93   HEMOGLOBIN 10.6*   HEMATOCRIT 32.6*   PLATELETS 230   NEUTROS ABS 4.36   IMMATURE GRANS (ABS) 0.01   LYMPHS ABS 0.99   MONOS ABS 0.51   EOS ABS 0.04   MCV 94.5         Lab 02/14/25 2118   SODIUM 132*   POTASSIUM 3.2*   CHLORIDE 98   CO2 21.6*   ANION GAP 12.4   BUN 19   CREATININE 1.09*   EGFR 50.5*   GLUCOSE 122*   CALCIUM 8.7         Lab 02/14/25 2118   TOTAL PROTEIN 6.5   ALBUMIN 3.7   GLOBULIN 2.8   ALT (SGPT) <5   AST (SGOT) 20   BILIRUBIN <0.2   ALK PHOS 50   LIPASE 47                     UA          2/14/2025    21:34   Urinalysis   Specific [place], UA  1.010    Ketones, UA Negative    Blood, UA Negative    Leukocytes, UA Negative    Nitrite, UA Negative        Microbiology Results (last 10 days)       Procedure Component Value - Date/Time    COVID-19, FLU A/B, RSV PCR 1 HR TAT - Swab, Nasopharynx [[id number]]  (Normal) Collected: 02/14/25 2134    Lab Status: Final result Specimen: Swab from Nasopharynx Updated: 02/14/25 2216     COVID19 Not Detected     Influenza A PCR Not Detected     Influenza B PCR Not Detected     RSV, PCR Not Detected    Narrative:      Fact sheet for providers: https://www.fda.gov/media/795977/download    Fact sheet for patients: https://www.fda.gov/media/793669/download    Test performed by PCR.            XR Chest 1 View    Result Date: 2/14/2025  XR CHEST 1 VW Date of Exam: 2/14/2025 10:02 PM EST Indication: Presurgical evaluation, evaluate nasogastric tube placement. Comparison: 1/11/2018. Findings: The tip of the nasogastric tube terminates in the fundus of the stomach. The heart size is normal. The pulmonary vascular markings are normal. The lungs and pleural spaces are clear of active disease. There are chronic age-related changes involving the bony thorax and thoracic aorta.     Impression: Impression: 1.The tip of the nasogastric tube terminates in the fundus of the stomach. 2.No active pulmonary disease. Electronically Signed: Darnell Cleaning MD  2/14/2025 10:20 PM EST  Workstation ID: [id number]    CT Abdomen Pelvis With Contrast    Result Date: 2/14/2025  CT ABDOMEN PELVIS W CONTRAST Date of Exam: 2/14/2025 8:42 PM EST Indication: abd pain, diarrhea. Comparison: None available. Technique: Axial CT images were obtained of the abdomen and pelvis following the uneventful intravenous administration of 75 cc Isovue-300. Reconstructed coronal and sagittal images were also obtained. Automated exposure control and iterative construction methods were used. Findings: LUNG BASES:  Unremarkable without mass or infiltrate. LIVER:  Unremarkable  parenchyma without focal lesion. BILIARY/GALLBLADDER: Cholecystectomy SPLEEN:  Unremarkable PANCREAS:  Unremarkable ADRENAL:  Unremarkable KIDNEYS:  Unremarkable parenchyma with no solid mass identified. No obstruction.  No calculus identified. GASTROINTESTINAL/MESENTERY: There are prominent loops of distal small intestine measuring up to 2.3 cm in diameter with gas fluid levels. There is an abrupt tapering in the level of the right lower quadrant with adjacent tapering cecum (images 90-99, series 2). Imaging features are consistent with at least partial mechanical small bowel and cecal obstruction related to an internal hernia. There is mesenteric edema within the central pelvis. Intestinal tract is otherwise unremarkable. MESENTERIC VESSELS:  Patent. AORTA/IVC:  Normal caliber. RETROPERITONEUM/LYMPH NODES:  Unremarkable REPRODUCTIVE: Obscured by hip arthroplasty artifact but presumed hysterectomy. BLADDER:  Unremarkable OSSEUS STRUCTURES: There are bilateral total hip arthroplasties with associated streak and beam hardening artifact.     Impression: Impression: 1.There is an internal hernia within the right central upper pelvis resulting in at least partial distal small bowel obstruction. Associated mesenteric edema surrounding loops of intestine within the central pelvis. Electronically Signed: Amrit Cruz MD  2/14/2025 9:10 PM EST  Workstation ID: [id number]         Assessment & Plan   Assessment & Plan       Internal hernia    Small bowel obstruction secondary to internal hernia due to adhesions  Volvulus around adhesions in the pelvis  Bowel ischemia  -Status post ex lap with lysis of adhesions and small bowel resection per Dr. Hi  -Pain control  -NG tube  -IV fluids per surgery  -N.p.o.  -Continue Reid cath  -PT/OT    Hypertension  -In review of chart, appears patient fills lisinopril 10 mg daily, awaiting pharmacy to complete med rec    Anxiety/depression  -Fill history indicates patient on Prozac 40mg  daily, awaiting pharmacy to complete med rec    Insomnia  -Fill Hx with Ambien 10mg, awaiting pharmacy to complete med rec     VTE Prophylaxis:  Pharmacologic VTE prophylaxis orders are signed & held. Mechanical VTE prophylaxis orders are present.          CODE STATUS:    Code Status and Medical Interventions: CPR (Attempt to Resuscitate); Full Support   Ordered at: 02/15/25 0151     Code Status (Patient has no pulse and is not breathing):    CPR (Attempt to Resuscitate)     Medical Interventions (Patient has pulse or is breathing):    Full Support       Expected Discharge   Expected Discharge Date: 2/19/2025; Expected Discharge Time:      Mary Desai DO  02/15/25

## 2025-02-15 NOTE — PROGRESS NOTES
"Patient Name:  Rebeca Mondragon  YOB: 1940  [id number]    Surgery Progress Note    Date of visit: 2/15/2025    Subjective   S/p exp lap, lysis of adhesions and small bowel resection early this morning. No gas or bowel movement yet. Having pain but controlled with medications. Sitting at side of bed on my evaluation. Ambulating with therapy.          Objective       /65 (BP Location: Left arm, Patient Position: Lying)   Pulse 94   Temp 98.5 °F (36.9 °C) (Oral)   Resp 18   Ht 160 cm (63\")   Wt 52.2 kg (115 lb)   SpO2 96%   BMI 20.37 kg/m²     Intake/Output Summary (Last 24 hours) at 2/15/2025 1508  Last data filed at 2/15/2025 1141  Gross per 24 hour   Intake 2100 ml   Output 1225 ml   Net 875 ml       General: Alert, oriented x 3, well-appearing, no acute distress  Cardiovascular: regular rate and rhythm  Pulmonary: Breathing comfortably on nasal cannula, no respiratory distress  Abdomen: Soft, appropriately tender, non-distended, incisions are clean/dry/intact with dressing in place    Recent labs and imaging that are back at this time have been reviewed.     Labs:    Results from last 7 days   Lab Units 02/15/25  0415   WBC 10*3/mm3 6.98   HEMOGLOBIN g/dL 10.1*   HEMATOCRIT % 32.1*   PLATELETS 10*3/mm3 229     Results from last 7 days   Lab Units 02/15/25  0415   SODIUM mmol/L 135*   POTASSIUM mmol/L 4.1   CHLORIDE mmol/L 102   CO2 mmol/L 24.0   BUN mg/dL 18   CREATININE mg/dL 0.90   CALCIUM mg/dL 8.3*   BILIRUBIN mg/dL 0.2   ALK PHOS U/L 60   ALT (SGPT) U/L 296*   AST (SGOT) U/L 511*   GLUCOSE mg/dL 162*     Results from last 7 days   Lab Units 02/15/25  0415   SODIUM mmol/L 135*   POTASSIUM mmol/L 4.1   CHLORIDE mmol/L 102   CO2 mmol/L 24.0   BUN mg/dL 18   CREATININE mg/dL 0.90   GLUCOSE mg/dL 162*   CALCIUM mg/dL 8.3*     Lab Results   Lab Value Date/Time    LIPASE 47 02/14/2025 2118            Assessment & Plan     Problem List Items Addressed This Visit       * (Principal) " Internal hernia     Other Visit Diagnoses       Other partial intestinal obstruction    -  Primary    Hypokalemia        Bowel obstruction        Relevant Orders    Tissue Pathology Exam            Active Hospital Problems    Diagnosis  POA    **Internal hernia [K45.8]  Yes      Resolved Hospital Problems   No resolved problems to display.        Rebeca Mondragon is a 84 y.o. female presenting with internal herniation and resulting small bowel obstruction s/p exploratory laparotomy, lysis of adhesions ,reduction of internal hernia, and small bowel resection.     1 Day Post-Op    -NPO, NGT to LWS until having bowel function  -Okay for ice chips for comfort  -Pain: tylenol, dilaudid  -Reid: okay to remove from my standpoint  -Antibiotics: not indicated  -PT/OT consults   Ambulate at least 4 times daily, IS multiple times q shift  -Bowel regimen  -Appreciate hospitalist assistance managing chronic medical conditions    -Activity: ad lauren and as tolerated except no heavy lifting > 30 lb x 4-6 weeks  -Wound Care: okay to shower on 2/16, no baths/submerging incisions x 2 weeks  -Follow-up: pending discharge      Jose Francisco Hi MD  2/15/2025  15:08 EST

## 2025-02-15 NOTE — OP NOTE
OPERATIVE NOTE    Patient Name:  Rebeca Mondragon  YOB: 1940  [id number]     Date of Surgery:  2/14/2025 - 2/15/2025     Pre-op Diagnosis: Small bowel obstruction with internal hernia    Post-op Diagnosis:   Small bowel obstruction with internal hernia due to adhesions  Volvulus around adhesions in the pelvis  Bowel ischemia    Procedure:   Exploratory laparotomy  Lysis of adhesions x 30 minutes  Small bowel resection    Surgeon: Jose Francisco Hi MD    Assistant: Assistant: Alyssia Garcia PA     Anesthesia: General with Block  ASA Score: III         Estimated Blood Loss: minimal    Complications: None apparent    Implants:    Implant Name Type Inv. Item Serial No.  Lot No. LRB No. Used Action   STPLR LNR CUT PROX 75MM DORIAN [id number] - [id number] Implant STPLR LNR CUT PROX 75MM DORIAN [id number]  [employer]  DIV OF J AND J [id number] N/A 1 Implanted   RELOAD STPLR LNR CUT PROX 75MM DORIAN TCR75 - [id number] Implant RELOAD STPLR LNR CUT PROX 75MM DORIAN TCR75  [employer]  DIV OF J AND J [id number] N/A 1 Implanted   RELOAD STPLR LNR CUT PROX 75MM DORIAN TCR75 - [id number] Implant RELOAD STPLR LNR CUT PROX 75MM DORIAN TCR75  [employer]  DIV OF J AND J [id number] N/A 1 Implanted   STPLR LNR CUT 60MM DORIAN REG [id number] - [id number] Implant STPLR LNR CUT 60MM DORIAN REG [id number]  [employer]  DIV OF J AND J [id number] N/A 1 Implanted   RELOAD STPLR LNR CUT PROX 75MM DORIAN TCR75 - [id number] Implant RELOAD STPLR LNR CUT PROX 75MM DORIAN TCR75  [employer]  DIV OF J AND J [id number] N/A 1 Implanted   DEV CONTRL TISS STRATAFIX SYMM PDS PLUS PENNY CT-1 45CM - [id number] Implant DEV CONTRL TISS STRATAFIX SYMM PDS PLUS PENNY CT-1 45CM  [employer]  DIV OF J AND J [id number] N/A 1 Implanted   DEV CONTRL TISS STRATAFIX SYMM PDS PLUS PENNY CT-1 45CM - [id number] Implant DEV CONTRL TISS STRATAFIX SYMM PDS PLUS PENNY CT-1 45CM  [employer]  DIV OF J AND J [id number] N/A 1 Implanted       Specimen:          Specimens       ID Source Type  Tests Collected By Collected At Frozen?    A Small Intestine Tissue TISSUE PATHOLOGY EXAM   Jose Francisco Hi MD 2/15/25 0035 No    This specimen was not marked as sent.              Findings: There was multiple bands of adhesions in the right lower quadrant and pelvis serving as a lead point for volvulus of both the small intestine and cecum.  This caused an internal hernia of small bowel which was hemorrhagic and partially ischemic with areas of patchy necrosis.  This area was resected to healthy bowel.    Indications:  Mrs. Rebeca Mondragon is an 84 year old woman presenting with a several hour history of sudden onset generalized crampy abdominal pain. This was sudden and severe. After starting this was constant. No alleviating factors. Worse with movement. It was associated with significant nausea and vomiting. Patient has never had this before. Last bowel movement was prior to this episode and was diarrhea. On my assessment, patient is in pre-op still with significant tenderness and discomfort. A NGT has been placed but with minimal out and no alleviation of symptoms.     Patient has a history of open appendectomy and cholecystectomy in 1969, open tubal ligation in 1971, and bilateral hip replacements. Patient is unsure about other surgeries on abdomen.     Labs on admission show a whie count 5.9, Hb 10.6, Cr 1.1. CT A/P shows evidence of an internal hernia with mesenteric edema and a resulting bowel obstruction.    Description of Procedure:   After informed consent was obtained, patient identification verified, and pre operative checklist completed, the patient was brought to the Operating Room and placed comfortably in the supine position on the operating table.  The patient was given appropriate antimicrobial prophylaxis.    General anesthesia was administered without complication and the patient was intubated without difficulty.  A Reid catheter was placed. A NGT was already in place. TAP blocks were  performed.  The patient’s abdomen was prepped and draped in the usual sterile fashion. After an appropriate surgical pause and time out was completed, a vertical midline incision was made.  Dissection was taken down through the subcutaneous tissue and fascia with cautery.  The abdomen was entered sharply.  There was scattered adhesions to the anterior abdominal wall between the omentum and the peritoneum.  These were taken with a combination of sharp and cautery dissection.  The transverse colon was also adhesed to the abdominal wall in the right upper quadrant.  This was also taken down to rule out to be eviscerated.  Approximately 30 minutes of lysis of adhesions was conducted.  The transverse colon was then lifted and the ligament of Treitz identified.  The bowel was run distally to the point of internal herniation.  This could not be extracted.  Attention was turned to the cecum which was medialized to the midline and twisted around the point of the internal hernia.  There was mildly bloody ascites in the right lower quadrant which was suctioned free.  The bowel involved in the hernia was extremely congested with areas of hemorrhagic change and patchy necrosis.  There was a dense fibrous adhesive band in the right lower quadrant which was lysed.  This released some of the small bowel in this area.  Lysis of adhesions continued into the pelvis where a piece of ileum was densely adhesed deep into the pelvis.  These adhesions were lysed sharply.  This allowed for the bowel to be excreted from the pelvis.  The internal hernia was then reduced and untwisted.  The combination of these adhesions appeared to have created a lead point causing a volvulus of both the small bowel and cecum with an area of internal herniation of the small bowel.  This was all straightened out.  The bowel was again run in its entirety.  The hemorrhagic and ischemic bowel affected for integrity.  After some deliberation, it was determined that  this needed to be resected and was not viable.  2 GREYSON blue load staplers were used to resect this segment of bowel.  The intervening mesentery was taken with an Enseal device.  Approximately 75-80 centimeters of bowel was resected.  The patient had greater than 250 cm of bowel remaining along with the ileocecal valve.  A side-to-side, functional end-to-end, anastomosis was fashioned between the 2 limbs using a GREYSON blue load stapler and a TX blue load for the common channel.  The common enterotomy was reinforced with 3-0 silk Lembert sutures.  A 3-0 silk Lembert was placed as a crotch stitch.  The mesenteric defect was closed with a running 3-0 silk suture.  The anastomosis was patent and well-perfused at its completion.  The bowel was run 1 more time to ensure correct orientation, integrity, and perfusion.  A liter of warm saline was used to washout the abdomen.  The NG tube was determined to be in appropriate position in the stomach.  A section of bleeding omentum was resected with the energy device.  The fascia was then closed with bidirectional #1 STRATAFIX PDS sutures.  The skin was closed with staples and dressed with 4 x 4 dressing and Medipore tape.    All sponge and needle counts were correct times two at the completion of the procedure. The patient recovered from anesthesia, was extubated in the operating room and was transported to the PACU in stable condition.    Assistant: Alyssia Garcia PA  was responsible for performing the following activities: Retraction, Suction, Irrigation, Suturing, Closing, and Placing Dressing and their skilled assistance was necessary for the success of this case.    Jose Francisco Hi MD     Date: 2/15/2025  Time: 01:22 EST

## 2025-02-15 NOTE — ANESTHESIA POSTPROCEDURE EVALUATION
Patient: Rebeca Mondragon    Procedure Summary       Date: 02/14/25 Room / Location:  [facility] /  [facility]    Anesthesia Start: 2335 Anesthesia Stop: 02/15/25 0134    Procedure: LAPAROTOMY EXPLORATORY WITH LYSIS OF ADHESIONS, SMALL BOWEL RESECTION (Abdomen) Diagnosis: (ABDOMINAL PAIN)    Surgeons: Jose Francisco Hi MD Provider: Reece Cabral MD    Anesthesia Type: general with block ASA Status: 3 - Emergent            Anesthesia Type: general with block    Vitals  Vitals Value Taken Time   /126 02/15/25 0129   Temp     Pulse 89 02/15/25 0134   Resp     SpO2 99 % 02/15/25 0134   Vitals shown include unfiled device data.        Post Anesthesia Care and Evaluation    Patient location during evaluation: PACU  Patient participation: complete - patient participated  Level of consciousness: confused  Pain score: 3  Pain management: adequate    Airway patency: patent  Anesthetic complications: No anesthetic complications  PONV Status: none  Cardiovascular status: acceptable  Respiratory status: acceptable, nasal cannula and spontaneous ventilation

## 2025-02-15 NOTE — ANESTHESIA PREPROCEDURE EVALUATION
Anesthesia Evaluation     Patient summary reviewed and Nursing notes reviewed   NPO Solid Status: Waived due to emergency  NPO Liquid Status: Waived due to emergency           Airway   TM distance: >3 FB  Neck ROM: full  No difficulty expected  Dental    (+) upper dentures    Pulmonary - normal exam   Cardiovascular   Exercise tolerance: good (4-7 METS)    Rhythm: regular  Rate: normal    (+) hypertension well controlled      Neuro/Psych  (+) CVA  GI/Hepatic/Renal/Endo      Musculoskeletal     Abdominal    Substance History      OB/GYN          Other                    Anesthesia Plan    ASA 3 - emergent     general with block     (TAP blocks post induction for post op pain control)    Anesthetic plan, risks, benefits, and alternatives have been provided, discussed and informed consent has been obtained with: patient.    CODE STATUS:

## 2025-02-15 NOTE — CONSULTS
General Surgery Consultation Note    Date of Service: 2/14/2025  Rebeca Mondragon  [id number]  1940      Referring Provider: Aldo Camara DO    Location of Consult: ER     Reason for Consultation: internal hernia with bowel obstruction       History of Present Illness:  I am seeing, Rebeca Mondragon, in consultation for Aldo Camara DO regarding an internal hernia.    Mrs. Rebeca Mondragon is an 84 year old woman presenting with a several hour history of sudden onset generalized crampy abdominal pain. This was sudden and severe. After starting this was constant. No alleviating factors. Worse with movement. It was associated with significant nausea and vomiting. Patient has never had this before. Last bowel movement was prior to this episode and was diarrhea. On my assessment, patient is in pre-op still with significant tenderness and discomfort. A NGT has been placed but with minimal out and no alleviation of symptoms.    Patient has a history of open appendectomy and cholecystectomy in 1969, open tubal ligation in 1971, and bilateral hip replacements. Patient is unsure about other surgeries on abdomen.    Labs on admission show a whie count 5.9, Hb 10.6, Cr 1.1. CT A/P shows evidence of an internal hernia with mesenteric edema and a resulting bowel obstruction.     Problems Addressed this Visit    None  Visit Diagnoses       Other partial intestinal obstruction    -  Primary    Internal hernia        Hypokalemia              Diagnoses         Codes Comments    Other partial intestinal obstruction    -  Primary ICD-10-CM: K56.690  ICD-9-CM: 560.89     Internal hernia     ICD-10-CM: K45.8  ICD-9-CM: 553.8     Hypokalemia     ICD-10-CM: E87.6  ICD-9-CM: 276.8             History reviewed. No pertinent past medical history.    No past surgical history on file.    Allergies   Allergen Reactions    Bactrim [Sulfamethoxazole-Trimethoprim] Unknown - Low Severity    Statins Unknown - Low  "Severity       No current facility-administered medications on file prior to encounter.     No current outpatient medications on file prior to encounter.         Current Facility-Administered Medications:     benzocaine (HURRICAINE) 20 % liquid solution 1 spray, 1 spray, Mouth/Throat, Once, Aldo Camara,     morphine injection 4 mg, 4 mg, Intravenous, Once, Aldo Camara,     potassium chloride 10 mEq in 100 mL IVPB, 10 mEq, Intravenous, Once, Aldo Camara DO    Insert Peripheral IV, , , Once **AND** sodium chloride 0.9 % flush 10 mL, 10 mL, Intravenous, PRN, Aldo Camara,     sodium chloride 0.9 % infusion, 125 mL/hr, Intravenous, Continuous, Aldo Camara,   No current outpatient medications on file.    History reviewed. No pertinent family history.  Social History     Socioeconomic History    Marital status:        Review of Systems:  Per HPI, otherwise the 12 point review of systems is negative.    /65 (BP Location: Right arm, Patient Position: Sitting)   Pulse 73   Temp 98 °F (36.7 °C) (Oral)   Resp 16   Ht 160 cm (63\")   Wt 52.2 kg (115 lb)   SpO2 100%   BMI 20.37 kg/m²   Body mass index is 20.37 kg/m².    General: alert, oriented x 3, chronically illl-appearing, in distress  HEENT: normocephalic, atraumatic, sclerae anicteric, external ears normal   Cardiovascular: regular rate and regular rhythm  Pulmonary: breathing comfortably on room air, no respiratory distress  Gastrointestinal: soft, tender to palpation, distended, prior surgical scars are well-healed  Extremity: no clubbing, cyanosis, edema   Neuro: cognitively intact, CN grossly intact, no focal deficits  Psych: depressed mood and affect    CBC  Results from last 7 days   Lab Units 02/14/25  2118   WBC 10*3/mm3 5.93   HEMOGLOBIN g/dL 10.6*   HEMATOCRIT % 32.6*   PLATELETS 10*3/mm3 230       CMP  Results from last 7 days   Lab Units 02/14/25 2118   SODIUM mmol/L 132*   POTASSIUM mmol/L 3.2* "   CHLORIDE mmol/L 98   CO2 mmol/L 21.6*   BUN mg/dL 19   CREATININE mg/dL 1.09*   CALCIUM mg/dL 8.7   BILIRUBIN mg/dL <0.2   ALK PHOS U/L 50   ALT (SGPT) U/L <5   AST (SGOT) U/L 20   GLUCOSE mg/dL 122*       Radiology  Imaging Results (Last 72 Hours)       Procedure Component Value Units Date/Time    XR Chest 1 View [[id number]] Resulted: 02/14/25 2218     Updated: 02/14/25 2217    CT Abdomen Pelvis With Contrast [[id number]] Collected: 02/14/25 2105     Updated: 02/14/25 2132    Narrative:      CT ABDOMEN PELVIS W CONTRAST    Date of Exam: 2/14/2025 8:42 PM EST    Indication: abd pain, diarrhea.    Comparison: None available.    Technique: Axial CT images were obtained of the abdomen and pelvis following the uneventful intravenous administration of 75 cc Isovue-300. Reconstructed coronal and sagittal images were also obtained. Automated exposure control and iterative   construction methods were used.      Findings:  LUNG BASES:  Unremarkable without mass or infiltrate.    LIVER:  Unremarkable parenchyma without focal lesion.  BILIARY/GALLBLADDER: Cholecystectomy  SPLEEN:  Unremarkable  PANCREAS:  Unremarkable  ADRENAL:  Unremarkable  KIDNEYS:  Unremarkable parenchyma with no solid mass identified. No obstruction.  No calculus identified.  GASTROINTESTINAL/MESENTERY: There are prominent loops of distal small intestine measuring up to 2.3 cm in diameter with gas fluid levels. There is an abrupt tapering in the level of the right lower quadrant with adjacent tapering cecum (images 90-99,   series 2). Imaging features are consistent with at least partial mechanical small bowel and cecal obstruction related to an internal hernia. There is mesenteric edema within the central pelvis. Intestinal tract is otherwise unremarkable.  MESENTERIC VESSELS:  Patent.  AORTA/IVC:  Normal caliber.    RETROPERITONEUM/LYMPH NODES:  Unremarkable    REPRODUCTIVE: Obscured by hip arthroplasty artifact but presumed hysterectomy.  BLADDER:   Unremarkable    OSSEUS STRUCTURES: There are bilateral total hip arthroplasties with associated streak and beam hardening artifact.        Impression:      Impression:  1.There is an internal hernia within the right central upper pelvis resulting in at least partial distal small bowel obstruction. Associated mesenteric edema surrounding loops of intestine within the central pelvis.        Electronically Signed: Amrit Cruz MD    2/14/2025 9:10 PM EST    Workstation ID: [id number]          ASSESSMENT/PLAN:  Rebeca Mondragon is a 84 y.o. female presenting with internal herniation and resulting small bowel obstruction.    Plan to proceed to OR for exploratory laparotomy, lysis of adhesions, reduction and repair of internal hernia, possible bowel obstruction.    The risks and benefits of the procedure were discussed including, but not limited to: bleeding, infection, injury to adjacent structures, need for re-intervention (operative intervention, drain placement, etc.), and medical complications due to the patient's underlying co-morbidities and the risks of general anesthesia.     The patient wishes to proceed and consented for surgery. All of the patient's questions were answered.     Jose Francisco Hi MD  02/14/25  22:19 EST

## 2025-02-15 NOTE — ANESTHESIA PROCEDURE NOTES
Airway  Urgency: elective    Date/Time: 2/14/2025 11:43 PM  Airway not difficult    General Information and Staff    Patient location during procedure: OR  Anesthesiologist: Reece Cabral MD    Indications and Patient Condition  Indications for airway management: airway protection    Preoxygenated: yes  MILS not maintained throughout  Mask difficulty assessment: 1 - vent by mask    Final Airway Details  Final airway type: endotracheal airway      Successful airway: ETT  Cuffed: yes   Successful intubation technique: direct laryngoscopy  Endotracheal tube insertion site: oral  Blade: [place]  Blade size: 3  ETT size (mm): 7.0  Cormack-Lehane Classification: grade I - full view of glottis  Placement verified by: chest auscultation and capnometry   Measured from: lips  ETT/EBT  to lips (cm): 20  Number of attempts at approach: 1  Assessment: lips, teeth, and gum same as pre-op and atraumatic intubation    Additional Comments  Negative epigastric sounds, Breath sound equal bilaterally with symmetric chest rise and fall

## 2025-02-15 NOTE — THERAPY EVALUATION
Patient Name: Rebeca Mondragon  : 1940    MRN: [id number]                              Today's Date: 2/15/2025       Admit Date: 2025    Visit Dx:     ICD-10-CM ICD-9-CM   1. Other partial intestinal obstruction  K56.690 560.89   2. Internal hernia  K45.8 553.8   3. Hypokalemia  E87.6 276.8   4. Bowel obstruction  K56.609 560.9     Patient Active Problem List   Diagnosis    Internal hernia     Past Medical History:   Diagnosis Date    Depression     Hypertension      Past Surgical History:   Procedure Laterality Date    APPENDECTOMY      CHOLECYSTECTOMY      JOINT REPLACEMENT      TUBAL ABDOMINAL LIGATION        General Information       Row Name 02/15/25 [facility]          Physical Therapy Time and Intention    Document Type evaluation  -     Mode of Treatment physical therapy  -       Row Name 02/15/25 [facility]          General Information    Patient Profile Reviewed yes  -MARLYS     Prior Level of Function independent:;gait;transfer;bed mobility;ADL's  -     Existing Precautions/Restrictions no known precautions/restrictions  -     Barriers to Rehab none identified  -       Row Name 02/15/25 1415          Living Environment    People in Home spouse  -       Row Name 02/15/25 1415          Home Main Entrance    Number of Stairs, Main Entrance five  -       Row Name 02/15/25 1415          Stairs Within Home, Primary    Number of Stairs, Within Home, Primary twelve  -MARLYS     Stair Railings, Within Home, Primary railings safe and in good condition  -       Row Name 02/15/25 1415          Cognition    Orientation Status (Cognition) oriented x 4  -       Row Name 02/15/25 1415          Safety Issues/Impairments Affecting Functional Mobility    Safety Issues Affecting Function (Mobility) safety precautions follow-through/compliance  -     Impairments Affecting Function (Mobility) balance;endurance/activity tolerance  -               User Key  (r) = Recorded By, (t) = Taken By, (c) = Cosigned By       Initials Name Provider Type    Yuli Cifuentes PT Physical Therapist                   Mobility       Row Name 02/15/25 1416          Bed Mobility    Bed Mobility rolling left;rolling right;scooting/bridging;supine-sit  -MARLYS     Rolling Left [place] (Bed Mobility) modified independence  -MARLYS     Rolling Right [place] (Bed Mobility) modified independence  -MARLYS     Scooting/Bridging [place] (Bed Mobility) modified independence  -MARLYS     Supine-Sit [place] (Bed Mobility) contact guard  -MARLYS       Row Name 02/15/25 1416          Bed-Chair Transfer    Bed-Chair [place] (Transfers) contact guard  -MARLYS     Assistive Device (Bed-Chair Transfers) walker, front-wheeled  -MARLYS       Row Name 02/15/25 1416          Sit-Stand Transfer    Sit-Stand [place] (Transfers) contact guard  -MARLYS     Assistive Device (Sit-Stand Transfers) walker, front-wheeled  -MARLYS       Row Name 02/15/25 1416          Gait/Stairs (Locomotion)    [place] Level (Gait) minimum assist (75% patient effort)  -MARLYS     Assistive Device (Gait) walker, front-wheeled  -MARLYS     Distance in Feet (Gait) 80  -MARLYS     Deviations/Abnormal Patterns (Gait) jose miguel decreased;stride length decreased;base of support, narrow  -MARLYS     Bilateral Gait Deviations heel strike decreased  -MARLYS     Comment, (Gait/Stairs) patient has step through gait pattern needs cues for increased step length patient needs walker for balance  -MARLYS               User Key  (r) = Recorded By, (t) = Taken By, (c) = Cosigned By      Initials Name Provider Type    Yuli Cifuentes PT Physical Therapist                   Obj/Interventions       Row Name 02/15/25 1418          Range of Motion Comprehensive    General Range of Motion no range of motion deficits identified  -MARLYS       Row Name 02/15/25 1418          Strength Comprehensive (MMT)    Comment, General Manual Muscle Testing (MMT) Assessment generalized weakness 4/5  -MARLYS       Row Name 02/15/25 1418           Balance    Balance Assessment sitting static balance;sitting dynamic balance;standing static balance;standing dynamic balance  -MARLYS     Static Sitting Balance independent  -MARLYS     Dynamic Sitting Balance independent  -MARLYS     Position, Sitting Balance unsupported;sitting edge of bed  -MARLYS     Static Standing Balance contact guard  -MARLYS     Dynamic Standing Balance minimal assist  -MARLYS     Position/Device Used, Standing Balance walker, front-wheeled  -MARLYS               User Key  (r) = Recorded By, (t) = Taken By, (c) = Cosigned By      Initials Name Provider Type    Yuli Cifuentes A, PT Physical Therapist                   Goals/Plan       Row Name 02/15/25 1423          Bed Mobility Goal 1 (PT)    Activity/Assistive Device (Bed Mobility Goal 1, PT) bed mobility activities, all  -MARLYS     [place] Level/Cues Needed (Bed Mobility Goal 1, PT) independent  -MARLYS     Time Frame (Bed Mobility Goal 1, PT) short term goal (STG);5 days  -MARLYS     Progress/Outcomes (Bed Mobility Goal 1, PT) goal ongoing  -MARLYS       Row Name 02/15/25 1423          Transfer Goal 1 (PT)    Activity/Assistive Device (Transfer Goal 1, PT) sit-to-stand/stand-to-sit  -MARLYS     [place] Level/Cues Needed (Transfer Goal 1, PT) independent  -MARLYS     Time Frame (Transfer Goal 1, PT) long term goal (LTG);10 days  -MARLYS     Progress/Outcome (Transfer Goal 1, PT) goal ongoing  -MARLYS       Row Name 02/15/25 1423          Gait Training Goal 1 (PT)    Activity/Assistive Device (Gait Training Goal 1, PT) gait (walking locomotion)  -MARLYS     [place] Level (Gait Training Goal 1, PT) independent  -MARLYS     Distance (Gait Training Goal 1, PT) 400  -MARLYS     Time Frame (Gait Training Goal 1, PT) long term goal (LTG);10 days  -MARLYS     Progress/Outcome (Gait Training Goal 1, PT) goal ongoing  -MARLYS       Row Name 02/15/25 1423          Stairs Goal 1 (PT)    Activity/Assistive Device (Stairs Goal 1, PT) ascending stairs;descending stairs  -MARLYS     [place] Level/Cues Needed  (Stairs Goal 1, PT) independent  -MARLYS     Number of Stairs (Stairs Goal 1, PT) 10  -MARLYS     Time Frame (Stairs Goal 1, PT) long term goal (LTG);10 days  -MARLYS       Row Name 02/15/25 1423          Therapy Assessment/Plan (PT)    Planned Therapy Interventions (PT) balance training;bed mobility training;gait training;home exercise program;strengthening;stair training;transfer training  -MARLYS               User Key  (r) = Recorded By, (t) = Taken By, (c) = Cosigned By      Initials Name Provider Type    Yuli Cifuentes, PT Physical Therapist                   Clinical Impression       Row Name 02/15/25 1419          Pain    Pretreatment Pain Rating 2/10  -MARLYS     Posttreatment Pain Rating 3/10  -MARLYS     Pain Location abdomen  -MARLYS     Pain Management Interventions activity modification encouraged;exercise or physical activity utilized  -MARLYS     Response to Pain Interventions activity level improved;mobility function improved;activity participation with tolerable pain  -MARLYS       Row Name 02/15/25 1416          Plan of Care Review    Plan of Care Reviewed With patient;spouse  -MARLYS     Progress improving  -MARLYS     Outcome Evaluation PT eval is completed. patient presents S/P Exploratory Lap with small bowel resection. patient demonstrates impaired balance in standing requiring walker for balance she also demonstrates impaired transfers and gait compared to her baseline status. patient was able to ambulate 80 ft with min assist mild balance deficits. anticipate patient to be able to go home with family assist at D/C  -       Row Name 02/15/25 7025          Therapy Assessment/Plan (PT)    Patient/Family Therapy Goals Statement (PT) return to PLOF  -MARLYS     Rehab Potential (PT) good  -MARLYS     Criteria for Skilled Interventions Met (PT) yes;skilled treatment is necessary  -MARLYS     Therapy Frequency (PT) daily  -MARLYS     Predicted Duration of Therapy Intervention (PT) 10 days  -MARLYS       Row Name 02/15/25 141          Vital Signs     Pre Patient Position Supine  -MARLYS     Intra Patient Position Standing  -MARLYS     Post Patient Position Sitting  -MARLYS       Row Name 02/15/25 1419          Positioning and Restraints    Pre-Treatment Position in bed  -MARLYS     Post Treatment Position chair  -MARLYS     In Chair notified nsg;reclined;sitting;call light within reach;encouraged to call for assist;with family/caregiver  -MARLYS               User Key  (r) = Recorded By, (t) = Taken By, (c) = Cosigned By      Initials Name Provider Type    Yuli Cifuentes PT Physical Therapist                   Outcome Measures       Row Name 02/15/25 1423 02/15/25 0800       How much help from another person do you currently need...    Turning from your back to your side while in flat bed without using bedrails? 4  -MARLYS 3  -TS    Moving from lying on back to sitting on the side of a flat bed without bedrails? 3  -MARLYS 3  -TS    Moving to and from a bed to a chair (including a wheelchair)? 3  -MARLYS 3  -TS    Standing up from a chair using your arms (e.g., wheelchair, bedside chair)? 3  -MARLYS 3  -TS    Climbing 3-5 steps with a railing? 3  -MARLYS 3  -TS    To walk in hospital room? 3  -MARLYS 3  -TS    AM-PAC 6 Clicks Score (PT) 19  -MARLYS 18  -TS      Row Name 02/15/25 0255 02/15/25 0231       How much help from another person do you currently need...    Turning from your back to your side while in flat bed without using bedrails? 3  -CD 3  -CD    Moving from lying on back to sitting on the side of a flat bed without bedrails? 3  -CD 3  -CD    Moving to and from a bed to a chair (including a wheelchair)? 3  -CD 3  -CD    Standing up from a chair using your arms (e.g., wheelchair, bedside chair)? 3  -CD 3  -CD    Climbing 3-5 steps with a railing? 3  -CD 2  -CD    To walk in hospital room? 3  -CD 2  -CD    AM-PAC 6 Clicks Score (PT) 18  -CD 16  -CD              User Key  (r) = Recorded By, (t) = Taken By, (c) = Cosigned By      Initials Name Provider Type    Yuli Cifuentes PT Physical  Therapist    Padmini Aguiar, RN Registered Nurse    Natasha Burroughs RN Registered Nurse                                 Physical Therapy Education       Title: PT OT SLP Therapies (In Progress)       Topic: Physical Therapy (In Progress)       Point: Mobility training (In Progress)       Learning Progress Summary            Patient Acceptance, E, NR by MARLYS at 2/15/2025 1320                      Point: Home exercise program (In Progress)       Learning Progress Summary            Patient Acceptance, E, NR by MARLYS at 2/15/2025 1320                      Point: Body mechanics (In Progress)       Learning Progress Summary            Patient Acceptance, E, NR by MARLYS at 2/15/2025 1320                      Point: Precautions (In Progress)       Learning Progress Summary            Patient Acceptance, E, NR by MARLYS at 2/15/2025 1320                                      User Key       Initials Effective Dates Name Provider Type Discipline    MARLYS 02/03/23 -  Yuli Guzmán PT Physical Therapist PT                  PT Recommendation and Plan  Planned Therapy Interventions (PT): balance training, bed mobility training, gait training, home exercise program, strengthening, stair training, transfer training  Progress: improving  Outcome Evaluation: PT eval is completed. patient presents S/P Exploratory Lap with small bowel resection. patient demonstrates impaired balance in standing requiring walker for balance she also demonstrates impaired transfers and gait compared to her baseline status. patient was able to ambulate 80 ft with min assist mild balance deficits. anticipate patient to be able to go home with family assist at D/C     Time Calculation:   PT Evaluation Complexity  History, PT Evaluation Complexity: 3 or more personal factors and/or comorbidities  Examination of Body Systems (PT Eval Complexity): total of 4 or more elements  Clinical Presentation (PT Evaluation Complexity): evolving  Clinical Decision Making  (PT Evaluation Complexity): moderate complexity  Overall Complexity (PT Evaluation Complexity): moderate complexity     PT Charges       Row Name 02/15/25 1425             Time Calculation    Start Time 1320  -MARLYS      PT Received On 02/15/25  -MARLYS      PT Goal Re-Cert Due Date 02/25/25  -MARLYS         Untimed Charges    PT Eval/Re-eval Minutes 48  -MARLYS         Total Minutes    Untimed Charges Total Minutes 48  -MARLYS       Total Minutes 48  -MARLYS                User Key  (r) = Recorded By, (t) = Taken By, (c) = Cosigned By      Initials Name Provider Type    Yuli Cifuentes, PT Physical Therapist                  Therapy Charges for Today       Code Description Service Date Service Provider Modifiers Qty    [id number] HC PT EVAL MOD COMPLEXITY 4 2/15/2025 Yuli Guzmán PT GP 1            PT G-Codes  AM-PAC 6 Clicks Score (PT): 19  PT Discharge Summary  Anticipated Discharge Disposition (PT): home with assist    Yuli Guzmán PT  2/15/2025

## 2025-02-15 NOTE — FSED PROVIDER NOTE
"Subjective  History of Present Illness:    Patient presents to the emergency department with abdominal pain, nausea vomiting and diarrhea which started approximately 3 hours prior to arrival.  Denies any fever or chills.  States that she has generalized abdominal tenderness.  Denies any dysuria, hematuria or frequency.    Nurses Notes reviewed and agree, including vitals, allergies, social history and prior medical history.     REVIEW OF SYSTEMS: All systems reviewed and not pertinent unless noted.  Review of Systems   Gastrointestinal:  Positive for abdominal pain, diarrhea, nausea and vomiting.   All other systems reviewed and are negative.      History reviewed. No pertinent past medical history.    Allergies:    Bactrim [sulfamethoxazole-trimethoprim] and Statins      History reviewed. No pertinent surgical history.      Social History     Socioeconomic History    Marital status:          History reviewed. No pertinent family history.    Objective  Physical Exam:  /65 (BP Location: Right arm, Patient Position: Sitting)   Pulse 73   Temp 98 °F (36.7 °C) (Oral)   Resp 16   Ht 160 cm (63\")   Wt 52.2 kg (115 lb)   SpO2 100%   BMI 20.37 kg/m²      Physical Exam  Vitals and nursing note reviewed.   Constitutional:       General: She is not in acute distress.     Appearance: She is well-developed and normal weight. She is not ill-appearing, toxic-appearing or diaphoretic.   HENT:      Head: Normocephalic.   Cardiovascular:      Rate and Rhythm: Normal rate and regular rhythm.   Pulmonary:      Effort: Pulmonary effort is normal.      Breath sounds: Normal breath sounds. No wheezing or rales.   Abdominal:      Comments: Soft, nondistended, generalized abdominal tenderness with right lower quadrant predominance.  Guarding.  No rebound.  Hyperactive bowel sounds.   Skin:     General: Skin is warm.      Capillary Refill: Capillary refill takes less than 2 seconds.   Neurological:      General: No focal " deficit present.      Mental Status: She is alert.   Psychiatric:         Mood and Affect: Mood normal.         Behavior: Behavior normal.         Procedures    ED Course:         Lab Results (last 24 hours)       Procedure Component Value Units Date/Time    Comprehensive Metabolic Panel [[id number]]  (Abnormal) Collected: 02/14/25 2118    Specimen: Blood Updated: 02/14/25 2144     Glucose 122 mg/dL      BUN 19 mg/dL      Creatinine 1.09 mg/dL      Sodium 132 mmol/L      Potassium 3.2 mmol/L      Chloride 98 mmol/L      CO2 21.6 mmol/L      Calcium 8.7 mg/dL      Total Protein 6.5 g/dL      Albumin 3.7 g/dL      ALT (SGPT) <5 U/L      AST (SGOT) 20 U/L      Alkaline Phosphatase 50 U/L      Total Bilirubin <0.2 mg/dL      Globulin 2.8 gm/dL      A/G Ratio 1.3 g/dL      BUN/Creatinine Ratio 17.4     Anion Gap 12.4 mmol/L      eGFR 50.5 mL/min/1.73     Narrative:      GFR Categories in Chronic Kidney Disease (CKD)      GFR Category          GFR (mL/min/1.73)    Interpretation  G1                     90 or greater         Normal or high (1)  G2                      60-89                Mild decrease (1)  G3a                   45-59                Mild to moderate decrease  G3b                   30-44                Moderate to severe decrease  G4                    15-29                Severe decrease  G5                    14 or less           Kidney failure          (1)In the absence of evidence of kidney disease, neither GFR category G1 or G2 fulfill the criteria for CKD.    eGFR calculation 2021 CKD-EPI creatinine equation, which does not include race as a factor    CBC & Differential [[id number]]  (Abnormal) Collected: 02/14/25 2118    Specimen: Blood Updated: 02/14/25 2127    Narrative:      The following orders were created for panel order CBC & Differential.  Procedure                               Abnormality         Status                     ---------                               -----------         ------                      CBC Auto Differential[[id number]]         Abnormal            Final result                 Please view results for these tests on the individual orders.    Lipase [[id number]]  (Normal) Collected: 02/14/25 2118    Specimen: Blood Updated: 02/14/25 2143     Lipase 47 U/L     CBC Auto Differential [[id number]]  (Abnormal) Collected: 02/14/25 2118    Specimen: Blood Updated: 02/14/25 2127     WBC 5.93 10*3/mm3      RBC 3.45 10*6/mm3      Hemoglobin 10.6 g/dL      Hematocrit 32.6 %      MCV 94.5 fL      MCH 30.7 pg      MCHC 32.5 g/dL      RDW 12.9 %      RDW-SD 45.3 fl      MPV 8.8 fL      Platelets 230 10*3/mm3      Neutrophil % 73.5 %      Lymphocyte % 16.7 %      Monocyte % 8.6 %      Eosinophil % 0.7 %      Basophil % 0.3 %      Immature Grans % 0.2 %      Neutrophils, Absolute 4.36 10*3/mm3      Lymphocytes, Absolute 0.99 10*3/mm3      Monocytes, Absolute 0.51 10*3/mm3      Eosinophils, Absolute 0.04 10*3/mm3      Basophils, Absolute 0.02 10*3/mm3      Immature Grans, Absolute 0.01 10*3/mm3     Urinalysis With Microscopic If Indicated (No Culture) - Urine, Clean Catch [[id number]]  (Normal) Collected: 02/14/25 2134    Specimen: Urine, Clean Catch Updated: 02/14/25 2140     Color, UA Yellow     Appearance, UA Clear     pH, UA 5.5     Specific Gravity, UA 1.010     Glucose, UA Negative     Ketones, UA Negative     Bilirubin, UA Negative     Blood, UA Negative     Protein, UA Negative     Leuk Esterase, UA Negative     Nitrite, UA Negative     Urobilinogen, UA 0.2 E.U./dL    Narrative:      Urine microscopic not indicated.    COVID-19, FLU A/B, RSV PCR 1 HR TAT - Swab, Nasopharynx [[id number]] Collected: 02/14/25 2134    Specimen: Swab from Nasopharynx Updated: 02/14/25 2137             CT Abdomen Pelvis With Contrast    Result Date: 2/14/2025  CT ABDOMEN PELVIS W CONTRAST Date of Exam: 2/14/2025 8:42 PM EST Indication: abd pain, diarrhea. Comparison: None available. Technique: Axial CT images were obtained of  the abdomen and pelvis following the uneventful intravenous administration of 75 cc Isovue-300. Reconstructed coronal and sagittal images were also obtained. Automated exposure control and iterative construction methods were used. Findings: LUNG BASES:  Unremarkable without mass or infiltrate. LIVER:  Unremarkable parenchyma without focal lesion. BILIARY/GALLBLADDER: Cholecystectomy SPLEEN:  Unremarkable PANCREAS:  Unremarkable ADRENAL:  Unremarkable KIDNEYS:  Unremarkable parenchyma with no solid mass identified. No obstruction.  No calculus identified. GASTROINTESTINAL/MESENTERY: There are prominent loops of distal small intestine measuring up to 2.3 cm in diameter with gas fluid levels. There is an abrupt tapering in the level of the right lower quadrant with adjacent tapering cecum (images 90-99, series 2). Imaging features are consistent with at least partial mechanical small bowel and cecal obstruction related to an internal hernia. There is mesenteric edema within the central pelvis. Intestinal tract is otherwise unremarkable. MESENTERIC VESSELS:  Patent. AORTA/IVC:  Normal caliber. RETROPERITONEUM/LYMPH NODES:  Unremarkable REPRODUCTIVE: Obscured by hip arthroplasty artifact but presumed hysterectomy. BLADDER:  Unremarkable OSSEUS STRUCTURES: There are bilateral total hip arthroplasties with associated streak and beam hardening artifact.     Impression: Impression: 1.There is an internal hernia within the right central upper pelvis resulting in at least partial distal small bowel obstruction. Associated mesenteric edema surrounding loops of intestine within the central pelvis. Electronically Signed: Amrit Cruz MD  2/14/2025 9:10 PM EST  Workstation ID: [id number]        [facility]  Number of Diagnoses or Management Options  Internal hernia  Other partial intestinal obstruction  Diagnosis management comments: Was evaluated and CT scan was concerning for partial bowel obstruction with internal hernia.  This was  discussed with Dr. Hi.  She was given a bolus of fluid started on maintenance fluids and NG tube was placed.  Patient was transferred for further evaluation and treatment at [facility]       Amount and/or Complexity of Data Reviewed  Clinical lab tests: reviewed  Tests in the radiology section of CPT®: reviewed        Medications   sodium chloride 0.9 % flush 10 mL (has no administration in time range)   sodium chloride 0.9 % infusion (has no administration in time range)   morphine injection 4 mg (has no administration in time range)   benzocaine (HURRICAINE) 20 % liquid solution 1 spray (has no administration in time range)   lactated ringers bolus 1,000 mL (1,000 mL Intravenous New Bag 2/14/25 2122)   ondansetron (ZOFRAN) injection 4 mg (4 mg Intravenous Given 2/14/25 2125)   morphine injection 4 mg (4 mg Intravenous Given 2/14/25 2121)   iopamidol (ISOVUE-300) 61 % injection 100 mL (75 mL Intravenous Given 2/14/25 2051)       Data interpreted: Nursing notes reviewed, vital signs reviewed.  Labs independently interpreted by me (CBC, CMP, lipase, UA, troponin, ABG, lactic acid, procalcitonin).  Imaging independently interpreted by me (x-ray, CT scan).  EKG independently interpreted by me.  O2 saturation:    Counseling: Discussed the results above with the patient regarding need for admission or discharge.  Patient understands and agrees plan of care.      -----  ED Disposition       ED Disposition   Decision to Admit    Condition   --    Comment   --             Final diagnoses:   Other partial intestinal obstruction   Internal hernia     Your Follow-Up Providers    Follow-up information has not been specified.       Contact information for after-discharge care    Follow-up information has not been specified.          Your medication list      You have not been prescribed any medications.

## 2025-02-15 NOTE — PROGRESS NOTES
[facility] Medicine Services  ADMISSION FOLLOW-UP NOTE          Patient admitted after midnight, H&P by my partner performed earlier on today's date reviewed.  Interim findings, labs, and charting also reviewed.        The [facility] Problem List has been managed and updated to include any new diagnoses:  Active Hospital Problems    Diagnosis  POA    **Internal hernia [K45.8]  Yes      Resolved Hospital Problems   No resolved problems to display.         ADDITIONAL PLAN:  - detailed assessment and plan from admission reviewed  -   Erna Burns is an 84-year-old female with a history of hypertension and depression who presented with generalized abdominal pain, she was found to have an internal hernia in the right central pelvis that resulted in a small bowel obstruction.  Case was discussed with on-call surgeon, Dr. Hi who took immediately to the OR.She was found to have multiple bands of adhesions in the right lower quadrant and pelvis serving as a lead point for volvulus of both the small intestine and cecum. This caused an internal hernia of small bowel which was hemorrhagic and partially ischemic with areas of patchy necrosis. This was resected to healthy bowel.     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 Hi  -Pain control  -NG tube  -IV fluids per surgery  -N.p.o.  -Continue Reid cath  -PT/OT     Hypertension  -hold home anti hypertensives until above improves and able to tolerate PO      Anxiety/depression  -cont prozac     Insomnia  Ambien PRN         Expected Discharge   Expected Discharge Date: 2/19/2025; Expected Discharge Time:      Emely Kemp MD  02/15/25

## 2025-02-15 NOTE — PLAN OF CARE
Goal Outcome Evaluation:  Plan of Care Reviewed With: patient, spouse        Progress: improving  Outcome Evaluation: PT eval is completed. patient presents S/P Exploratory Lap with small bowel resection. patient demonstrates impaired balance in standing requiring walker for balance she also demonstrates impaired transfers and gait compared to her baseline status. patient was able to ambulate 80 ft with min assist mild balance deficits. anticipate patient to be able to go home with family assist at D/C    Anticipated Discharge Disposition (PT): home with assist

## 2025-02-16 LAB
ANION GAP SERPL CALCULATED.3IONS-SCNC: 9 MMOL/L (ref 5–15)
BASOPHILS # BLD AUTO: 0.03 10*3/MM3 (ref 0–0.2)
BASOPHILS NFR BLD AUTO: 0.3 % (ref 0–1.5)
BUN SERPL-MCNC: 10 MG/DL (ref 8–23)
BUN/CREAT SERPL: 12.2 (ref 7–25)
CALCIUM SPEC-SCNC: 8.8 MG/DL (ref 8.6–10.5)
CHLORIDE SERPL-SCNC: 102 MMOL/L (ref 98–107)
CO2 SERPL-SCNC: 25 MMOL/L (ref 22–29)
CREAT SERPL-MCNC: 0.82 MG/DL (ref 0.57–1)
DEPRECATED RDW RBC AUTO: 48.2 FL (ref 37–54)
EGFRCR SERPLBLD CKD-EPI 2021: 71.1 ML/MIN/1.73
EOSINOPHIL # BLD AUTO: 0.02 10*3/MM3 (ref 0–0.4)
EOSINOPHIL NFR BLD AUTO: 0.2 % (ref 0.3–6.2)
ERYTHROCYTE [DISTWIDTH] IN BLOOD BY AUTOMATED COUNT: 13.5 % (ref 12.3–15.4)
FERRITIN SERPL-MCNC: 229.5 NG/ML (ref 13–150)
FOLATE SERPL-MCNC: 13 NG/ML (ref 4.78–24.2)
GLUCOSE SERPL-MCNC: 111 MG/DL (ref 65–99)
HCT VFR BLD AUTO: 31.5 % (ref 34–46.6)
HGB BLD-MCNC: 10.1 G/DL (ref 12–15.9)
IMM GRANULOCYTES # BLD AUTO: 0.06 10*3/MM3 (ref 0–0.05)
IMM GRANULOCYTES NFR BLD AUTO: 0.6 % (ref 0–0.5)
IRON 24H UR-MRATE: 10 MCG/DL (ref 37–145)
IRON SATN MFR SERPL: 4 % (ref 20–50)
LYMPHOCYTES # BLD AUTO: 1.55 10*3/MM3 (ref 0.7–3.1)
LYMPHOCYTES NFR BLD AUTO: 15.1 % (ref 19.6–45.3)
MCH RBC QN AUTO: 31.1 PG (ref 26.6–33)
MCHC RBC AUTO-ENTMCNC: 32.1 G/DL (ref 31.5–35.7)
MCV RBC AUTO: 96.9 FL (ref 79–97)
MONOCYTES # BLD AUTO: 0.77 10*3/MM3 (ref 0.1–0.9)
MONOCYTES NFR BLD AUTO: 7.5 % (ref 5–12)
NEUTROPHILS NFR BLD AUTO: 7.82 10*3/MM3 (ref 1.7–7)
NEUTROPHILS NFR BLD AUTO: 76.3 % (ref 42.7–76)
NRBC BLD AUTO-RTO: 0 /100 WBC (ref 0–0.2)
PLATELET # BLD AUTO: 244 10*3/MM3 (ref 140–450)
PMV BLD AUTO: 8.9 FL (ref 6–12)
POTASSIUM SERPL-SCNC: 4.3 MMOL/L (ref 3.5–5.2)
RBC # BLD AUTO: 3.25 10*6/MM3 (ref 3.77–5.28)
SODIUM SERPL-SCNC: 136 MMOL/L (ref 136–145)
TIBC SERPL-MCNC: 268 MCG/DL (ref 298–536)
TRANSFERRIN SERPL-MCNC: 180 MG/DL (ref 200–360)
VIT B12 BLD-MCNC: 555 PG/ML (ref 211–946)
WBC NRBC COR # BLD AUTO: 10.25 10*3/MM3 (ref 3.4–10.8)

## 2025-02-16 PROCEDURE — 25010000002 ENOXAPARIN PER 10 MG: Performed by: STUDENT IN AN ORGANIZED HEALTH CARE EDUCATION/TRAINING PROGRAM

## 2025-02-16 PROCEDURE — 25010000002 PROCHLORPERAZINE 10 MG/2ML SOLUTION: Performed by: STUDENT IN AN ORGANIZED HEALTH CARE EDUCATION/TRAINING PROGRAM

## 2025-02-16 PROCEDURE — 84466 ASSAY OF TRANSFERRIN: CPT | Performed by: STUDENT IN AN ORGANIZED HEALTH CARE EDUCATION/TRAINING PROGRAM

## 2025-02-16 PROCEDURE — 82728 ASSAY OF FERRITIN: CPT | Performed by: STUDENT IN AN ORGANIZED HEALTH CARE EDUCATION/TRAINING PROGRAM

## 2025-02-16 PROCEDURE — 82746 ASSAY OF FOLIC ACID SERUM: CPT | Performed by: STUDENT IN AN ORGANIZED HEALTH CARE EDUCATION/TRAINING PROGRAM

## 2025-02-16 PROCEDURE — 85025 COMPLETE CBC W/AUTO DIFF WBC: CPT | Performed by: STUDENT IN AN ORGANIZED HEALTH CARE EDUCATION/TRAINING PROGRAM

## 2025-02-16 PROCEDURE — 97166 OT EVAL MOD COMPLEX 45 MIN: CPT

## 2025-02-16 PROCEDURE — 83540 ASSAY OF IRON: CPT | Performed by: STUDENT IN AN ORGANIZED HEALTH CARE EDUCATION/TRAINING PROGRAM

## 2025-02-16 PROCEDURE — 80048 BASIC METABOLIC PNL TOTAL CA: CPT | Performed by: STUDENT IN AN ORGANIZED HEALTH CARE EDUCATION/TRAINING PROGRAM

## 2025-02-16 PROCEDURE — 99232 SBSQ HOSP IP/OBS MODERATE 35: CPT | Performed by: STUDENT IN AN ORGANIZED HEALTH CARE EDUCATION/TRAINING PROGRAM

## 2025-02-16 PROCEDURE — 25010000002 PROCHLORPERAZINE 10 MG/2ML SOLUTION: Performed by: NURSE PRACTITIONER

## 2025-02-16 PROCEDURE — 25010000002 HYDROMORPHONE PER 4 MG: Performed by: STUDENT IN AN ORGANIZED HEALTH CARE EDUCATION/TRAINING PROGRAM

## 2025-02-16 PROCEDURE — 82607 VITAMIN B-12: CPT | Performed by: STUDENT IN AN ORGANIZED HEALTH CARE EDUCATION/TRAINING PROGRAM

## 2025-02-16 RX ORDER — HYDRALAZINE HYDROCHLORIDE 20 MG/ML
10 INJECTION INTRAMUSCULAR; INTRAVENOUS EVERY 6 HOURS PRN
Status: DISCONTINUED | OUTPATIENT
Start: 2025-02-16 | End: 2025-02-19 | Stop reason: HOSPADM

## 2025-02-16 RX ORDER — ACETAMINOPHEN 325 MG/1
650 TABLET ORAL EVERY 6 HOURS PRN
Status: DISCONTINUED | OUTPATIENT
Start: 2025-02-16 | End: 2025-02-19 | Stop reason: HOSPADM

## 2025-02-16 RX ORDER — FERROUS SULFATE 324(65)MG
324 TABLET, DELAYED RELEASE (ENTERIC COATED) ORAL
COMMUNITY

## 2025-02-16 RX ORDER — PROCHLORPERAZINE EDISYLATE 5 MG/ML
2.5 INJECTION INTRAMUSCULAR; INTRAVENOUS ONCE
Status: COMPLETED | OUTPATIENT
Start: 2025-02-16 | End: 2025-02-16

## 2025-02-16 RX ORDER — PROCHLORPERAZINE EDISYLATE 5 MG/ML
2.5 INJECTION INTRAMUSCULAR; INTRAVENOUS
Status: DISCONTINUED | OUTPATIENT
Start: 2025-02-16 | End: 2025-02-19 | Stop reason: HOSPADM

## 2025-02-16 RX ADMIN — Medication 10 ML: at 07:41

## 2025-02-16 RX ADMIN — PHENOL 1 SPRAY: 1.5 LIQUID ORAL at 17:33

## 2025-02-16 RX ADMIN — HYDROMORPHONE HYDROCHLORIDE 0.5 MG: 1 INJECTION, SOLUTION INTRAMUSCULAR; INTRAVENOUS; SUBCUTANEOUS at 23:11

## 2025-02-16 RX ADMIN — HYDROMORPHONE HYDROCHLORIDE 0.5 MG: 1 INJECTION, SOLUTION INTRAMUSCULAR; INTRAVENOUS; SUBCUTANEOUS at 00:52

## 2025-02-16 RX ADMIN — PROCHLORPERAZINE EDISYLATE 2.5 MG: 5 INJECTION INTRAMUSCULAR; INTRAVENOUS at 10:26

## 2025-02-16 RX ADMIN — Medication 10 ML: at 10:27

## 2025-02-16 RX ADMIN — DEXTROSE MONOHYDRATE, SODIUM CHLORIDE, AND POTASSIUM CHLORIDE 75 ML/HR: 50; 1.49; 4.5 INJECTION, SOLUTION INTRAVENOUS at 05:35

## 2025-02-16 RX ADMIN — HYDROMORPHONE HYDROCHLORIDE 0.5 MG: 1 INJECTION, SOLUTION INTRAMUSCULAR; INTRAVENOUS; SUBCUTANEOUS at 17:32

## 2025-02-16 RX ADMIN — ENOXAPARIN SODIUM 30 MG: 30 INJECTION SUBCUTANEOUS at 10:27

## 2025-02-16 RX ADMIN — PROCHLORPERAZINE EDISYLATE 2.5 MG: 5 INJECTION INTRAMUSCULAR; INTRAVENOUS at 23:11

## 2025-02-16 RX ADMIN — HYDROMORPHONE HYDROCHLORIDE 0.5 MG: 1 INJECTION, SOLUTION INTRAMUSCULAR; INTRAVENOUS; SUBCUTANEOUS at 15:35

## 2025-02-16 RX ADMIN — PROCHLORPERAZINE EDISYLATE 2.5 MG: 5 INJECTION INTRAMUSCULAR; INTRAVENOUS at 03:20

## 2025-02-16 RX ADMIN — ACETAMINOPHEN 650 MG: 325 TABLET ORAL at 20:20

## 2025-02-16 RX ADMIN — Medication 10 ML: at 20:22

## 2025-02-16 RX ADMIN — DICLOFENAC SODIUM 2 G: 10 GEL TOPICAL at 13:18

## 2025-02-16 RX ADMIN — HYDROMORPHONE HYDROCHLORIDE 0.5 MG: 1 INJECTION, SOLUTION INTRAMUSCULAR; INTRAVENOUS; SUBCUTANEOUS at 13:23

## 2025-02-16 RX ADMIN — HYDROMORPHONE HYDROCHLORIDE 0.5 MG: 1 INJECTION, SOLUTION INTRAMUSCULAR; INTRAVENOUS; SUBCUTANEOUS at 20:21

## 2025-02-16 RX ADMIN — DEXTROSE MONOHYDRATE, SODIUM CHLORIDE, AND POTASSIUM CHLORIDE 75 ML/HR: 50; 1.49; 4.5 INJECTION, SOLUTION INTRAVENOUS at 19:40

## 2025-02-16 RX ADMIN — HYDROMORPHONE HYDROCHLORIDE 0.5 MG: 1 INJECTION, SOLUTION INTRAMUSCULAR; INTRAVENOUS; SUBCUTANEOUS at 10:27

## 2025-02-16 RX ADMIN — PROCHLORPERAZINE EDISYLATE 2.5 MG: 5 INJECTION INTRAMUSCULAR; INTRAVENOUS at 20:20

## 2025-02-16 RX ADMIN — PROCHLORPERAZINE EDISYLATE 2.5 MG: 5 INJECTION INTRAMUSCULAR; INTRAVENOUS at 13:23

## 2025-02-16 RX ADMIN — HYDROMORPHONE HYDROCHLORIDE 0.5 MG: 1 INJECTION, SOLUTION INTRAMUSCULAR; INTRAVENOUS; SUBCUTANEOUS at 07:41

## 2025-02-16 RX ADMIN — HYDROMORPHONE HYDROCHLORIDE 0.5 MG: 1 INJECTION, SOLUTION INTRAMUSCULAR; INTRAVENOUS; SUBCUTANEOUS at 03:19

## 2025-02-16 NOTE — PROGRESS NOTES
[facility] Medicine Services  PROGRESS NOTE    Patient Name: Rebeca Mondragon  : 1940  MRN: [id number]    Date of Admission: 2025  Primary Care Physician: Lizeth Johnson MD    Subjective   Subjective     CC:  F/u bowel obstruction    HPI:  Endorsing sore throat, asking when NG can come out      Objective   Objective     Vital Signs:   Temp:  [98.6 °F (37 °C)-99.1 °F (37.3 °C)] 98.6 °F (37 °C)  Heart Rate:  [] 94  Resp:  [16-18] 16  BP: (137-165)/(67-80) 165/80     Physical Exam:  Constitutional: No acute distress, awake, alert  HENT: NCAT, mucous membranes moist  Respiratory: Clear to auscultation bilaterally, respiratory effort normal on RA  Cardiovascular: RRR, no murmurs, rubs, or gallops  Gastrointestinal: faint bowel sounds, NG to suction  Musculoskeletal: No bilateral ankle edema  Psychiatric: Appropriate affect, cooperative  Neurologic: Oriented x 3,no focal deficits  Skin: No rashes      Results Reviewed:  LAB RESULTS:      Lab 25  1256 02/15/25  1716 02/15/25  1059 02/15/25  0415 25   WBC 10.25  --   --  6.98 5.93   HEMOGLOBIN 10.1*  --   --  10.1* 10.6*   HEMATOCRIT 31.5*  --   --  32.1* 32.6*   PLATELETS 244  --   --  229 230   NEUTROS ABS 7.82*  --   --  6.23 4.36   IMMATURE GRANS (ABS) 0.06*  --   --  0.01 0.01   LYMPHS ABS 1.55  --   --  0.24* 0.99   MONOS ABS 0.77  --   --  0.48 0.51   EOS ABS 0.02  --   --  0.00 0.04   MCV 96.9  --   --  97.6* 94.5   LACTATE  --  1.8 2.1* 2.6*  --          Lab 25  1256 02/15/25  0415 25  2118   SODIUM 136 135* 132*   POTASSIUM 4.3 4.1 3.2*   CHLORIDE 102 102 98   CO2 25.0 24.0 21.6*   ANION GAP 9.0 9.0 12.4   BUN 10 18 19   CREATININE 0.82 0.90 1.09*   EGFR 71.1 63.6 50.5*   GLUCOSE 111* 162* 122*   CALCIUM 8.8 8.3* 8.7   MAGNESIUM  --  1.7  --    PHOSPHORUS  --  3.6  --          Lab 02/15/25  0415 25  2118   TOTAL PROTEIN 4.9* 6.5   ALBUMIN 3.2* 3.7   GLOBULIN 1.7 2.8   ALT  (SGPT) 296* <5   AST (SGOT) 511* 20   BILIRUBIN 0.2 <0.2   ALK PHOS 60 50   LIPASE  --  47                 Lab 02/16/25  1256   IRON 10*   IRON SATURATION (TSAT) 4*   TIBC 268*   TRANSFERRIN 180*   FERRITIN 229.50*         Brief Urine Lab Results  (Last result in the past 365 days)        Color   Clarity   Blood   Leuk Est   Nitrite   Protein   CREAT   Urine HCG        02/14/25 2134 Yellow   Clear   Negative   Negative   Negative   Negative                   Microbiology Results Abnormal       None            XR Chest 1 View    Result Date: 2/14/2025  XR CHEST 1 VW Date of Exam: 2/14/2025 10:02 PM EST Indication: Presurgical evaluation, evaluate nasogastric tube placement. Comparison: 1/11/2018. Findings: The tip of the nasogastric tube terminates in the fundus of the stomach. The heart size is normal. The pulmonary vascular markings are normal. The lungs and pleural spaces are clear of active disease. There are chronic age-related changes involving the bony thorax and thoracic aorta.     Impression: Impression: 1.The tip of the nasogastric tube terminates in the fundus of the stomach. 2.No active pulmonary disease. Electronically Signed: Darnell Cleaning MD  2/14/2025 10:20 PM EST  Workstation ID: [id number]    CT Abdomen Pelvis With Contrast    Result Date: 2/14/2025  CT ABDOMEN PELVIS W CONTRAST Date of Exam: 2/14/2025 8:42 PM EST Indication: abd pain, diarrhea. Comparison: None available. Technique: Axial CT images were obtained of the abdomen and pelvis following the uneventful intravenous administration of 75 cc Isovue-300. Reconstructed coronal and sagittal images were also obtained. Automated exposure control and iterative construction methods were used. Findings: LUNG BASES:  Unremarkable without mass or infiltrate. LIVER:  Unremarkable parenchyma without focal lesion. BILIARY/GALLBLADDER: Cholecystectomy SPLEEN:  Unremarkable PANCREAS:  Unremarkable ADRENAL:  Unremarkable KIDNEYS:  Unremarkable parenchyma with no  solid mass identified. No obstruction.  No calculus identified. GASTROINTESTINAL/MESENTERY: There are prominent loops of distal small intestine measuring up to 2.3 cm in diameter with gas fluid levels. There is an abrupt tapering in the level of the right lower quadrant with adjacent tapering cecum (images 90-99, series 2). Imaging features are consistent with at least partial mechanical small bowel and cecal obstruction related to an internal hernia. There is mesenteric edema within the central pelvis. Intestinal tract is otherwise unremarkable. MESENTERIC VESSELS:  Patent. AORTA/IVC:  Normal caliber. RETROPERITONEUM/LYMPH NODES:  Unremarkable REPRODUCTIVE: Obscured by hip arthroplasty artifact but presumed hysterectomy. BLADDER:  Unremarkable OSSEUS STRUCTURES: There are bilateral total hip arthroplasties with associated streak and beam hardening artifact.     Impression: Impression: 1.There is an internal hernia within the right central upper pelvis resulting in at least partial distal small bowel obstruction. Associated mesenteric edema surrounding loops of intestine within the central pelvis. Electronically Signed: Amrit Cruz MD  2/14/2025 9:10 PM EST  Workstation ID: [id number]         Current medications:  Scheduled Meds:[Held by provider] aspirin, 81 mg, Oral, Daily  Diclofenac Sodium, 2 g, Topical, 4x Daily  enoxaparin, 30 mg, Subcutaneous, Daily  FLUoxetine, 40 mg, Oral, Daily  [Held by provider] lisinopril, 10 mg, Oral, Q24H  pantoprazole, 40 mg, Oral, Q AM  sodium chloride, 10 mL, Intravenous, Q12H      Continuous Infusions:dextrose 5 % and sodium chloride 0.45 % with KCl 20 mEq/L, 75 mL/hr, Last Rate: 75 mL/hr (02/16/25 0535)  pharmacy consult - MTM,       PRN Meds:.  acetaminophen    Calcium Replacement - Follow Nurse / BPA Driven Protocol    HYDROmorphone **AND** naloxone    Magnesium Standard Dose Replacement - Follow Nurse / BPA Driven Protocol    pharmacy consult - MTM    nitroglycerin     nitroglycerin    ondansetron ODT **OR** ondansetron    ondansetron ODT **OR** ondansetron    phenol    Phosphorus Replacement - Follow Nurse / BPA Driven Protocol    Potassium Replacement - Follow Nurse / BPA Driven Protocol    prochlorperazine    sodium chloride    sodium chloride    zolpidem    Assessment & Plan   Assessment & Plan     Active Hospital Problems    Diagnosis  POA    **Internal hernia [K45.8]  Yes      Resolved Hospital Problems   No resolved problems to display.        Brief Hospital Course to date:  Rebeca Mondragon is a 84 y.o. female who presented with generalized abdominal pain, she was found to have an internal hernia in the right central pelvis that resulted in a small bowel obstruction.  Case was discussed with on-call surgeon, Dr. Hi who took immediately to the OR.She was found to have multiple bands of adhesions in the right lower quadrant and pelvis serving as a lead point for volvulus of both the small intestine and cecum. This caused an internal hernia of small bowel which was hemorrhagic and partially ischemic with areas of patchy necrosis. This was resected to healthy bowel.      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 Hi  -Pain control  -cont NG tube for now as she still hasn't had adequate bowel function. If has flatus can remove  --encouraged ambulation and pulm toilet. Pt/ot       Hypertension  -hold home anti hypertensives until above improves and able to tolerate PO   -IV hydralazine PRN while can't take PO     Anxiety/depression  -cont prozac     Insomnia  Ambien PRN    Expected Discharge Location and Transportation: home  Expected Discharge   Expected Discharge Date: 2/19/2025; Expected Discharge Time:      VTE Prophylaxis:  Pharmacologic & mechanical VTE prophylaxis orders are present.         AM-PAC 6  Clicks Score (PT): 18 (02/16/25 0742)    CODE STATUS:   Code Status and Medical Interventions: CPR (Attempt to Resuscitate); Full Support   Ordered at: 02/15/25 0218     Level Of Support Discussed With:    Patient     Code Status (Patient has no pulse and is not breathing):    CPR (Attempt to Resuscitate)     Medical Interventions (Patient has pulse or is breathing):    Full Support       Emely Kemp MD  02/16/25

## 2025-02-16 NOTE — PROGRESS NOTES
"          Clinical Nutrition Assessment     Patient Name: Rebeca Mondragon  YOB: 1940  MRN: [id number]  Date of Encounter: 02/16/25 16:04 EST  Admission date: 2/14/2025  Reason for Visit: MST score 2+, Unintentional weight loss, Reduced oral intake    Assessment   Nutrition Assessment   Admission Diagnosis:  Internal hernia [K45.8]    Problem List:    Internal hernia      PMH:   She  has a past medical history of Depression and Hypertension.    PSH:  She  has a past surgical history that includes Appendectomy; Cholecystectomy; Tubal ligation; and Joint replacement.    Applicable Nutrition History:   NG tube in place for suction    Anthropometrics     Height: Height: 160 cm (63\")  Last Filed Weight: Weight: 53.3 kg (117 lb 8 oz) (02/16/25 0500)  Method: Weight Method: Bed scale  BMI: BMI (Calculated): 20.8    UBW:  116-117# typically, now 112# per dtr  Weight change:  No significant changes w/ limited wt hx     Weight      Weight (kg) Weight (lbs) Weight Method   2/14/2025 52.164 kg  115 lb     2/15/2025 52.164 kg  115 lb  Bed scale    2/16/2025 53.298 kg  117 lb 8 oz  Bed scale    2/17/2025 53.162 kg  117 lb 3.2 oz  Bed scale      Per EMR: 10/16/24 119#, 11/20/24 121#    Nutrition Focused Physical Exam    Date:  2/16       Unable to perform due to Pt unable to participate at time of visit     Subjective   Reported/Observed/Food/Nutrition Related History:     Patient asleep at time of visit, dtrs at bedside able to provide patient nutrition hx. Dtr reports prior to emergency surgery patient was eating normally with a good appetite. Dtr notes pt does have mild nausea and abdominal discomfort at baseline. Pt has no issues C/S. Dtr notes patient likes fruit. NKFA. Dtr reports per MD that patient is having some bowel sounds, no BM or gas passed.    Current Nutrition Prescription   PO: NPO Diet NPO Type: Strict NPO  Oral Nutrition Supplement: N/A  Intake: N/A    Assessment & Plan   Nutrition Diagnosis "   Date:  2/17            Updated:    Problem Inadequate oral intake   Etiology S/p small bowel resection 2/2 internal hernia   Signs/Symptoms NPO   Status: New    Goal:   Nutrition to support treatment and Establish PO, Advance diet as medically feasible/appropriate      Nutrition Intervention      Follow treatment progress, Care plan reviewed, Await begin PO    Follow for diet advancement as appropriate  NFPE when feasible    Monitoring/Evaluation:   Per protocol, I&O, Pertinent labs, Weight, GI status, Symptoms, POC/GOC    Bharti Barrera RD  Time Spent: 30m

## 2025-02-16 NOTE — THERAPY EVALUATION
Patient Name: Rebeca Mondragon  : 1940    MRN: [id number]                              Today's Date: 2025       Admit Date: 2025    Visit Dx:     ICD-10-CM ICD-9-CM   1. Other partial intestinal obstruction  K56.690 560.89   2. Internal hernia  K45.8 553.8   3. Hypokalemia  E87.6 276.8   4. Bowel obstruction  K56.609 560.9     Patient Active Problem List   Diagnosis    Internal hernia     Past Medical History:   Diagnosis Date    Depression     Hypertension      Past Surgical History:   Procedure Laterality Date    APPENDECTOMY      CHOLECYSTECTOMY      JOINT REPLACEMENT      TUBAL ABDOMINAL LIGATION        General Information       Row Name 25 1532          OT Time and Intention    Subjective Information complains of;weakness;pain  -MARLYS     Document Type evaluation  -MARLYS     Mode of Treatment individual therapy;occupational therapy  -MARLYS     Patient Effort good  -MARLYS     Symptoms Noted During/After Treatment increased pain;fatigue  -MARLYS       Row Name 25 153          General Information    Patient Profile Reviewed yes  -MARLYS     Prior Level of Function independent:;gait;transfer;bed mobility;feeding;grooming;dressing;bathing;cooking;cleaning;driving;shopping;using stairs;yard work  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  -MARLYS     Existing Precautions/Restrictions fall;other (see comments)  abdKAYA paiz foley  -MARLYS     Barriers to Rehab medically complex  -MARLYS       Row Name 25 1532          Occupational Profile    Environmental Supports and Barriers (Occupational Profile) multiple steps in home, tub shower, low tilet, grab bar in tub, not using AD to mobilize  -MARLYS       Row Name 25 1532          Living Environment    People in Home spouse  -MARLYS       Row Name 25 1532          Home Main Entrance    Number of Stairs, Main Entrance five  -MARLYS     Stair Railings, Main Entrance railings safe and in good condition  -MARLYS       Row Name 25 1532           Stairs Within Home, Primary    Number of Stairs, Within Home, Primary twelve  -     Stair Railings, Within Home, Primary railings safe and in good condition  -     Stairs Comment, Within Home, Primary flight of steps upstairs and a flight down stairs to family living area  -       Row Name 02/16/25 1532          Cognition    Orientation Status (Cognition) oriented x 4  -       Row Name 02/16/25 1532          Safety Issues/Impairments Affecting Functional Mobility    Safety Issues Affecting Function (Mobility) safety precaution awareness;safety precautions follow-through/compliance;sequencing abilities  -     Impairments Affecting Function (Mobility) balance;endurance/activity tolerance;strength  -               User Key  (r) = Recorded By, (t) = Taken By, (c) = Cosigned By      Initials Name Provider Type    Vita Galicia OT Occupational Therapist                     Mobility/ADL's       Row Name 02/16/25 1535          Bed Mobility    Bed Mobility rolling left;scooting/bridging;sidelying-sit;sit-sidelying  -     Rolling Left [place] (Bed Mobility) minimum assist (75% patient effort);1 person assist;verbal cues  -     Scooting/Bridging [place] (Bed Mobility) dependent (less than 25% patient effort);2 person assist  to HOB  -MARLYS     Sidelying-Sit [place] (Bed Mobility) minimum assist (75% patient effort);1 person assist;verbal cues;nonverbal cues (demo/gesture)  -     Sit-Sidelying [place] (Bed Mobility) minimum assist (75% patient effort);1 person assist;verbal cues;nonverbal cues (demo/gesture)  -     Bed Mobility, Safety Issues impaired trunk control for bed mobility  -     Assistive Device (Bed Mobility) bed rails;repositioning sheet  -       Row Name 02/16/25 1535          Transfers    Transfers sit-stand transfer;stand-sit transfer  -       Row Name 02/16/25 1535          Sit-Stand Transfer    Sit-Stand [place] (Transfers) contact guard;verbal cues;1  person assist  -     Assistive Device (Sit-Stand Transfers) walker, front-wheeled  -       Row Name 02/16/25 1535          Stand-Sit Transfer    Stand-Sit [place] (Transfers) contact guard;verbal cues;1 person assist  -[facility] Name 02/16/25 1535          Functional Mobility    Functional Mobility- Ind. Level 1 person;minimum assist (75% patient effort);1 person + 1 person to manage equipment  -     Functional Mobility- Device walker, front-wheeled  -     Functional Mobility-Distance (Feet) --  household distance  -     Functional Mobility- Safety Issues step length decreased  -     Functional Mobility- Comment cues for posture, pt. very unsteady at times with some weaving  -       Row Name 02/16/25 1535          Activities of Daily Living    BADL Assessment/Intervention lower body dressing;upper body dressing  -[facility] Name 02/16/25 1535          Lower Body Dressing Assessment/Training    Position (Lower Body Dressing) edge of bed sitting  -MARLYS     Comment, (Lower Body Dressing) Pt. crossed up LE's over knee with min A showing she could reach her feet for dressing.  -[facility] Name 02/16/25 1535          Upper Body Dressing Assessment/Training    [place] Level (Upper Body Dressing) doff;don;maximum assist (25% patient effort)  -MARLYS     Position (Upper Body Dressing) edge of bed sitting  -MARLYS     Comment, (Upper Body Dressing) limited by mutiple lines  -               User Key  (r) = Recorded By, (t) = Taken By, (c) = Cosigned By      Initials Name Provider Type    Vita Galicia, OT Occupational Therapist                   Obj/Interventions       Row Name 02/16/25 1537          Sensory Assessment (Somatosensory)    Sensory Assessment (Somatosensory) UE sensation intact  -[facility] Name 02/16/25 1537          Vision Assessment/Intervention    Visual Impairment/Limitations corrective lenses for distance  -[facility] Name 02/16/25 1537          Range of Motion Comprehensive     General Range of Motion bilateral upper extremity ROM WFL  -MARLYS       Row Name 02/16/25 1537          Strength Comprehensive (MMT)    Comment, General Manual Muscle Testing (MMT) Assessment BUE not formally assessed due to abdominal pain level, BUE at least 3+/5 with observation of activity  -[facility] Name 02/16/25 1537          Motor Skills    Motor Skills functional endurance  -MARLYS     Functional Endurance impaired from pt. prior highly active lifestyle  -[facility] Name 02/16/25 1537          Balance    Static Sitting Balance standby assist  -MARLYS     Dynamic Sitting Balance standby assist  -MARLYS     Position, Sitting Balance unsupported;sitting edge of bed  -MARLYS     Static Standing Balance contact guard;1-person assist  -MARLYS     Dynamic Standing Balance minimal assist;1-person assist  -MARLYS     Position/Device Used, Standing Balance supported;walker, front-wheeled  -MARLYS     Balance Interventions sit to stand;occupation based/functional task  -MARLYS     Comment, Balance LBD  -MARLYS               User Key  (r) = Recorded By, (t) = Taken By, (c) = Cosigned By      Initials Name Provider Type    MARLYS Vtia Corado, OT Occupational Therapist                   Goals/Plan       [facility] Name 02/16/25 1544          Bed Mobility Goal 1 (OT)    Activity/Assistive Device (Bed Mobility Goal 1, OT) rolling to right;rolling to left;sidelying to sit;sit to sidelying  -MARLYS     [place] Level/Cues Needed (Bed Mobility Goal 1, OT) contact guard required;verbal cues required  -MARLYS     Time Frame (Bed Mobility Goal 1, OT) long term goal (LTG);10 days  -MARLYS     Progress/Outcomes (Bed Mobility Goal 1, OT) new goal  -[facility] Name 02/16/25 1544          Transfer Goal 1 (OT)    Activity/Assistive Device (Transfer Goal 1, OT) toilet;commode, bedside without drop arms;walker, rolling  -MARLYS     [place] Level/Cues Needed (Transfer Goal 1, OT) contact guard required  -MARLYS     Time Frame (Transfer Goal 1, OT) short term goal (STG);5 days  -MARLYS      Progress/Outcome (Transfer Goal 1, OT) new goal  -MARLYS       Row Name 02/16/25 1544          Dressing Goal 1 (OT)    Activity/Device (Dressing Goal 1, OT) lower body dressing;other (see comments)  AE prn  -MARLYS     [place]/Cues Needed (Dressing Goal 1, OT) contact guard required;set-up required  -MARLYS     Time Frame (Dressing Goal 1, OT) short term goal (STG);5 days  -MARLYS     Strategies/Barriers (Dressing Goal 1, OT) socks and undergarment except for rosario control  -MARLYS     Progress/Outcome (Dressing Goal 1, OT) new goal  -MARLYS       Row Name 02/16/25 1544          Grooming Goal 1 (OT)    Activity/Device (Grooming Goal 1, OT) hair care;oral care;wash face, hands  -MARLYS     [place] (Grooming Goal 1, OT) standby assist  -MARLYS     Time Frame (Grooming Goal 1, OT) short term goal (STG);5 days  -MARLYS     Strategies/Barriers (Grooming Goal 1, OT) sinkside standing  -MARLYS     Progress/Outcome (Grooming Goal 1, OT) new goal  -MARLYS       Row Name 02/16/25 1544          Therapy Assessment/Plan (OT)    Planned Therapy Interventions (OT) activity tolerance training;adaptive equipment training;BADL retraining;functional balance retraining;occupation/activity based interventions;patient/caregiver education/training;strengthening exercise;transfer/mobility retraining;ROM/therapeutic exercise  -MARLYS               User Key  (r) = Recorded By, (t) = Taken By, (c) = Cosigned By      Initials Name Provider Type    MARLYS Vita Corado, OT Occupational Therapist                   Clinical Impression       Row Name 02/16/25 1539          Pain Assessment    Pretreatment Pain Rating 4/10  -MARLYS     Pain Location abdomen  -MARLYS     Pain Management Interventions nursing notified;premedicated for activity;positioning techniques utilized  -MARLYS     Response to Pain Interventions activity participation with tolerable pain  -MARLYS       Row Name 02/16/25 4673          Plan of Care Review    Plan of Care Reviewed With patient;family  -MARLYS     Progress no change  -MARLYS      Outcome Evaluation Patient presents with significant change in prior high level independence BADL's, cooking, cleaning, working and performing yard s/p exploratory lap with small bowel resection due to impaired endurance/activity tolerance, balance and strength.  Increase in assist today compared to PT evaluation 2/15. Skilled OT services appropriate to assist wtih return to PLOF. Recommend IRF pending medical appropriateness at discharge for best outcome.  -       Row Name 02/16/25 1539          Therapy Assessment/Plan (OT)    Patient/Family Therapy Goal Statement (OT) 10 days  -MARLYS     Rehab Potential (OT) good  -MARLYS     Criteria for Skilled Therapeutic Interventions Met (OT) yes;meets criteria;skilled treatment is necessary  -MARLYS     Therapy Frequency (OT) daily  -MARLYS     Predicted Duration of Therapy Intervention (OT) 10 days  -       Row Name 02/16/25 1539          Therapy Plan Review/Discharge Plan (OT)    Equipment Needs Upon Discharge (OT) shower chair;walker, rolling;other (see comments)  reacher  -MARLYS     Anticipated Discharge Disposition (OT) inpatient rehabilitation facility  -       Row Name 02/16/25 1539          Vital Signs    Pre Systolic BP Rehab 165  -MARLYS     Pre Treatment Diastolic BP 80  -MARLYS     Post Systolic BP Rehab 142  -MARLYS     Post Treatment Diastolic BP 69  -MARLYS     Pretreatment Heart Rate (beats/min) 99  -MARLYS     Posttreatment Heart Rate (beats/min) 104  -MARLYS     Pre SpO2 (%) 92  -MARLYS     O2 Delivery Pre Treatment room air  -MARLYS     Intra SpO2 (%) 92  -MARLYS     O2 Delivery Intra Treatment room air  -MARLYS     Post SpO2 (%) 95  -MARLYS     O2 Delivery Post Treatment room air  -MARLYS     Pre Patient Position Supine  -MARLYS     Intra Patient Position Standing  -MARLYS     Post Patient Position Supine  -MARLYS       Row Name 02/16/25 1539          Positioning and Restraints    Pre-Treatment Position in bed  -MARLYS     Post Treatment Position bed  -MARLYS     In Bed notified nsg;madhuri;call light within reach;encouraged to call  for assist;exit alarm on;with family/caregiver;side rails up x2;legs elevated;SCD pump applied  -MARLYS               User Key  (r) = Recorded By, (t) = Taken By, (c) = Cosigned By      Initials Name Provider Type    Vita Galicia, LINDSEY Occupational Therapist                   Outcome Measures       Row Name 02/16/25 1545          How much help from another is currently needed...    Putting on and taking off regular lower body clothing? 3  -MARLYS     Bathing (including washing, rinsing, and drying) 2  -MARLYS     Toileting (which includes using toilet bed pan or urinal) 2  -MARLYS     Putting on and taking off regular upper body clothing 3  -MARLYS     Taking care of personal grooming (such as brushing teeth) 3  -MARLYS     Eating meals 4  -MARLYS     AM-PAC 6 Clicks Score (OT) 17  -MARLYS       Row Name 02/16/25 0742          How much help from another person do you currently need...    Turning from your back to your side while in flat bed without using bedrails? 4  -DF     Moving from lying on back to sitting on the side of a flat bed without bedrails? 4  -DF     Moving to and from a bed to a chair (including a wheelchair)? 3  -DF     Standing up from a chair using your arms (e.g., wheelchair, bedside chair)? 3  -DF     Climbing 3-5 steps with a railing? 2  -DF     To walk in hospital room? 2  -DF     AM-PAC 6 Clicks Score (PT) 18  -DF       Row Name 02/16/25 1547          Functional Assessment    Outcome Measure Options AM-PAC 6 Clicks Daily Activity (OT)  -MARLYS               User Key  (r) = Recorded By, (t) = Taken By, (c) = Cosigned By      Initials Name Provider Type    Vita Galicia OT Occupational Therapist    DF Carmella Logan RN Registered Nurse                    Occupational Therapy Education       Title: PT OT SLP Therapies (In Progress)       Topic: Occupational Therapy (In Progress)       Point: ADL training (In Progress)       Description:   Instruct learner(s) on proper safety adaptation and remediation techniques during  self care or transfers.   Instruct in proper use of assistive devices.                  Learning Progress Summary            Patient Acceptance, E,D, NR by MARLYS at 2/16/2025 1546    Comment: reason for consult, noted deficits, log roll, rehab recommendations   Family Acceptance, E,D, NR by MARLYS at 2/16/2025 1546    Comment: reason for consult, noted deficits, log roll, rehab recommendations                      Point: Home exercise program (Not Started)       Description:   Instruct learner(s) on appropriate technique for monitoring, assisting and/or progressing therapeutic exercises/activities.                  Learner Progress:  Not documented in this visit.              Point: Precautions (In Progress)       Description:   Instruct learner(s) on prescribed precautions during self-care and functional transfers.                  Learning Progress Summary            Patient Acceptance, E,D, NR by MARLYS at 2/16/2025 1546    Comment: reason for consult, noted deficits, log roll, rehab recommendations   Family Acceptance, E,D, NR by MARLYS at 2/16/2025 1546    Comment: reason for consult, noted deficits, log roll, rehab recommendations                      Point: Body mechanics (Not Started)       Description:   Instruct learner(s) on proper positioning and spine alignment during self-care, functional mobility activities and/or exercises.                  Learner Progress:  Not documented in this visit.                              User Key       Initials Effective Dates Name Provider Type Discipline    MARLYS 07/11/23 -  Vita Corado OT Occupational Therapist OT                  OT Recommendation and Plan  Planned Therapy Interventions (OT): activity tolerance training, adaptive equipment training, BADL retraining, functional balance retraining, occupation/activity based interventions, patient/caregiver education/training, strengthening exercise, transfer/mobility retraining, ROM/therapeutic exercise  Therapy Frequency (OT):  daily  Plan of Care Review  Plan of Care Reviewed With: patient, family  Progress: no change  Outcome Evaluation: Patient presents with significant change in prior high level independence BADL's, cooking, cleaning, working and performing yard s/p exploratory lap with small bowel resection due to impaired endurance/activity tolerance, balance and strength.  Increase in assist today compared to PT evaluation 2/15. Skilled OT services appropriate to assist wtih return to PLOF. Recommend IRF pending medical appropriateness at discharge for best outcome.     Time Calculation:   Evaluation Complexity (OT)  Review Occupational Profile/Medical/Therapy History Complexity: expanded/moderate complexity  Assessment, Occupational Performance/Identification of Deficit Complexity: 3-5 performance deficits  Clinical Decision Making Complexity (OT): detailed assessment/moderate complexity  Overall Complexity of Evaluation (OT): moderate complexity     Time Calculation- OT       Row Name 02/16/25 1547             Time Calculation- OT    OT Start Time 1501  -MARLYS      OT Received On 02/16/25  -MARLYS      OT Goal Re-Cert Due Date 02/26/25  -MARLYS         Untimed Charges    OT Eval/Re-eval Minutes 49  -MARLYS         Total Minutes    Untimed Charges Total Minutes 49  -MARLYS       Total Minutes 49  -MARLYS                User Key  (r) = Recorded By, (t) = Taken By, (c) = Cosigned By      Initials Name Provider Type    Vita Galicia OT Occupational Therapist                  Therapy Charges for Today       Code Description Service Date Service Provider Modifiers Qty    91463693347 HC OT EVAL MOD COMPLEXITY 4 2/16/2025 Vita Cordao OT GO 1                 Vita Corado OT  2/16/2025

## 2025-02-16 NOTE — PLAN OF CARE
Goal Outcome Evaluation:  Plan of Care Reviewed With: patient, family        Progress: no change  Outcome Evaluation: Patient presents with significant change in prior high level independence BADL's, cooking, cleaning, working and performing yard s/p exploratory lap with small bowel resection due to impaired endurance/activity tolerance, balance and strength.  Increase in assist today compared to PT evaluation 2/15. Skilled OT services appropriate to assist wtih return to PLOF. Recommend IRF pending medical appropriateness at discharge for best outcome.    Anticipated Discharge Disposition (OT): inpatient rehabilitation facility

## 2025-02-16 NOTE — PROGRESS NOTES
"Patient Name:  Rebeca Mondragon  YOB: 1940  [id number]    Surgery Progress Note    Date of visit: 2/16/2025    Subjective   Doing well overall. Feels like bowels are moving but has not passed gas or had a BM. NGT bothering her with sore throat and hopes to have chloraseptic spray. Had nausea and uncontrolled pain overnight, but doing better with adjustments.        Objective       /80 (BP Location: Left arm, Patient Position: Lying)   Pulse 94   Temp 98.6 °F (37 °C) (Axillary)   Resp 16   Ht 160 cm (63\")   Wt 53.3 kg (117 lb 8 oz)   SpO2 95%   BMI 20.81 kg/m²     Intake/Output Summary (Last 24 hours) at 2/16/2025 1401  Last data filed at 2/16/2025 1329  Gross per 24 hour   Intake --   Output 1750 ml   Net -1750 ml       General: Alert, oriented x 3, well-appearing, no acute distress  Cardiovascular: regular rate and rhythm  Pulmonary: Breathing comfortably on nasal cannula, no respiratory distress  Abdomen: Soft, appropriately tender, non-distended, incisions are clean/dry/intact     Recent labs and imaging that are back at this time have been reviewed.     Labs:    Results from last 7 days   Lab Units 02/16/25  1256   WBC 10*3/mm3 10.25   HEMOGLOBIN g/dL 10.1*   HEMATOCRIT % 31.5*   PLATELETS 10*3/mm3 244     Results from last 7 days   Lab Units 02/16/25  1256 02/15/25  0415   SODIUM mmol/L 136 135*   POTASSIUM mmol/L 4.3 4.1   CHLORIDE mmol/L 102 102   CO2 mmol/L 25.0 24.0   BUN mg/dL 10 18   CREATININE mg/dL 0.82 0.90   CALCIUM mg/dL 8.8 8.3*   BILIRUBIN mg/dL  --  0.2   ALK PHOS U/L  --  60   ALT (SGPT) U/L  --  296*   AST (SGOT) U/L  --  511*   GLUCOSE mg/dL 111* 162*     Results from last 7 days   Lab Units 02/16/25  1256   SODIUM mmol/L 136   POTASSIUM mmol/L 4.3   CHLORIDE mmol/L 102   CO2 mmol/L 25.0   BUN mg/dL 10   CREATININE mg/dL 0.82   GLUCOSE mg/dL 111*   CALCIUM mg/dL 8.8     Lab Results   Lab Value Date/Time    LIPASE 47 02/14/2025 2118            Assessment & Plan "     Problem List Items Addressed This Visit       * (Principal) Internal hernia     Other Visit Diagnoses       Other partial intestinal obstruction    -  Primary    Hypokalemia        Bowel obstruction        Relevant Orders    Tissue Pathology Exam            Active Hospital Problems    Diagnosis  POA    **Internal hernia [K45.8]  Yes      Resolved Hospital Problems   No resolved problems to display.        Rebeca Mondragon is a 84 y.o. female presenting with internal herniation and resulting small bowel obstruction s/p exploratory laparotomy, lysis of adhesions ,reduction of internal hernia, and small bowel resection.     2 Days Post-Op    -NPO, NGT to LWS until having bowel function  -Okay to remove NGT and start clears if patient has flatus  -Okay for ice chips for comfort  -Pain: tylenol, dilaudid  -Reid: remove today  -Antibiotics: not indicated  -PT/OT consults   Ambulate at least 4 times daily, IS multiple times q shift  -Bowel regimen  -Appreciate hospitalist assistance managing chronic medical conditions    -Activity: ad lauren and as tolerated except no heavy lifting > 30 lb x 4-6 weeks  -Wound Care: okay to shower, no baths/submerging incisions x 2 weeks  -Follow-up: likely two weeks s/p dc, pending discharge      Jose Francisco Hi MD  2/16/2025  14:01 EST

## 2025-02-16 NOTE — CASE MANAGEMENT/SOCIAL WORK
Discharge Planning Assessment  [facility]     Patient Name: Rebeca Mondragon  MRN: [id number]  Today's Date: 2/16/2025    Admit Date: 2/14/2025    Plan: Home with spouse   Discharge Needs Assessment       Row Name 02/16/25 1502       Living Environment    People in Home spouse    Current Living Arrangements home    Potentially Unsafe Housing Conditions none    In the past 12 months has the electric, gas, oil, or water company threatened to shut off services in your home? No    Primary Care Provided by self    Provides Primary Care For no one    Family Caregiver if Needed child(kat), adult;spouse    Quality of Family Relationships helpful;involved;supportive    Able to Return to Prior Arrangements yes       Resource/Environmental Concerns    Resource/Environmental Concerns none    Transportation Concerns none       Transportation Needs    In the past 12 months, has lack of transportation kept you from medical appointments or from getting medications? no    In the past 12 months, has lack of transportation kept you from meetings, work, or from getting things needed for daily living? No       Food Insecurity    Within the past 12 months, you worried that your food would run out before you got the money to buy more. Never true    Within the past 12 months, the food you bought just didn't last and you didn't have money to get more. Never true       Transition Planning    Patient/Family Anticipates Transition to home with family    Patient/Family Anticipated Services at Transition     Transportation Anticipated family or friend will provide       Discharge Needs Assessment    Readmission Within the Last 30 Days no previous admission in last 30 days    Equipment Currently Used at Home none    Concerns to be Addressed discharge planning;denies needs/concerns at this time    Do you want help finding or keeping work or a job? I do not need or want help    Do you want help with school or training? For  example, starting or completing job training or getting a high school diploma, GED or equivalent No                   Discharge Plan       Row Name 02/16/25 1505       Plan    Plan Home with spouse    Patient/Family in Agreement with Plan yes    Plan Comments Spoke to patient at bedside to initiate discharge planning. She lives at home with her spouse in [facility]. Prior to admission, she was independent with ADL's and drives.  She does not use DME or home oxygen. She is not current with home health or outpatient therapy services. Her PCP is Lizeth Johnson. Verified [employer]. Her plan is home with her spouse. Spouse or daughter will transport. Will await therapy's recommendations. CM will continue to follow.    Final Discharge Disposition Code 01 - home or self-care                  Continued Care and Services - Admitted Since 2/14/2025    No active coordination exists for this encounter.       Expected Discharge Date and Time       Expected Discharge Date Expected Discharge Time    Feb 19, 2025            Demographic Summary       Row Name 02/16/25 150       General Information    Admission Type inpatient    Arrived From emergency department    Referral Source admission list    Reason for Consult discharge planning    Preferred Language English                   Functional Status       Row Name 02/16/25 1504       Functional Status    Usual Activity Tolerance good    Current Activity Tolerance other (see comments)  see therapy rec's       Physical Activity    On average, how many days per week do you engage in moderate to strenuous exercise (like a brisk walk)? 5 days    On average, how many minutes do you engage in exercise at this level? 30 min    Number of minutes of exercise per week 150       Functional Status, IADL    Medications independent    Meal Preparation independent    Housekeeping independent    Laundry independent    Shopping independent    If for any reason you need help with day-to-day  activities such as bathing, preparing meals, shopping, managing finances, etc., do you get the help you need? I don't need any help                   Psychosocial    No documentation.                  Abuse/Neglect    No documentation.                  Legal    No documentation.                  Substance Abuse    No documentation.                  Patient Forms    No documentation.                     Conchita Gusman RN

## 2025-02-17 ENCOUNTER — APPOINTMENT (OUTPATIENT)
Dept: GENERAL RADIOLOGY | Facility: HOSPITAL | Age: 85
End: 2025-02-17
Payer: MEDICARE

## 2025-02-17 LAB
ANION GAP SERPL CALCULATED.3IONS-SCNC: 8 MMOL/L (ref 5–15)
BASOPHILS # BLD AUTO: 0.02 10*3/MM3 (ref 0–0.2)
BASOPHILS NFR BLD AUTO: 0.2 % (ref 0–1.5)
BUN SERPL-MCNC: 9 MG/DL (ref 8–23)
BUN/CREAT SERPL: 10.7 (ref 7–25)
CALCIUM SPEC-SCNC: 8.8 MG/DL (ref 8.6–10.5)
CHLORIDE SERPL-SCNC: 102 MMOL/L (ref 98–107)
CO2 SERPL-SCNC: 26 MMOL/L (ref 22–29)
CREAT SERPL-MCNC: 0.84 MG/DL (ref 0.57–1)
DEPRECATED RDW RBC AUTO: 48.7 FL (ref 37–54)
EGFRCR SERPLBLD CKD-EPI 2021: 69 ML/MIN/1.73
EOSINOPHIL # BLD AUTO: 0.08 10*3/MM3 (ref 0–0.4)
EOSINOPHIL NFR BLD AUTO: 0.9 % (ref 0.3–6.2)
ERYTHROCYTE [DISTWIDTH] IN BLOOD BY AUTOMATED COUNT: 13.5 % (ref 12.3–15.4)
GLUCOSE SERPL-MCNC: 114 MG/DL (ref 65–99)
HCT VFR BLD AUTO: 29 % (ref 34–46.6)
HGB BLD-MCNC: 9 G/DL (ref 12–15.9)
IMM GRANULOCYTES # BLD AUTO: 0.04 10*3/MM3 (ref 0–0.05)
IMM GRANULOCYTES NFR BLD AUTO: 0.5 % (ref 0–0.5)
LYMPHOCYTES # BLD AUTO: 1.19 10*3/MM3 (ref 0.7–3.1)
LYMPHOCYTES NFR BLD AUTO: 14.1 % (ref 19.6–45.3)
MCH RBC QN AUTO: 30.4 PG (ref 26.6–33)
MCHC RBC AUTO-ENTMCNC: 31 G/DL (ref 31.5–35.7)
MCV RBC AUTO: 98 FL (ref 79–97)
MONOCYTES # BLD AUTO: 0.74 10*3/MM3 (ref 0.1–0.9)
MONOCYTES NFR BLD AUTO: 8.8 % (ref 5–12)
NEUTROPHILS NFR BLD AUTO: 6.38 10*3/MM3 (ref 1.7–7)
NEUTROPHILS NFR BLD AUTO: 75.5 % (ref 42.7–76)
NRBC BLD AUTO-RTO: 0 /100 WBC (ref 0–0.2)
PLATELET # BLD AUTO: 218 10*3/MM3 (ref 140–450)
PMV BLD AUTO: 9.2 FL (ref 6–12)
POTASSIUM SERPL-SCNC: 5 MMOL/L (ref 3.5–5.2)
RBC # BLD AUTO: 2.96 10*6/MM3 (ref 3.77–5.28)
SODIUM SERPL-SCNC: 136 MMOL/L (ref 136–145)
WBC NRBC COR # BLD AUTO: 8.45 10*3/MM3 (ref 3.4–10.8)

## 2025-02-17 PROCEDURE — 25010000002 HYDROMORPHONE PER 4 MG: Performed by: STUDENT IN AN ORGANIZED HEALTH CARE EDUCATION/TRAINING PROGRAM

## 2025-02-17 PROCEDURE — 99232 SBSQ HOSP IP/OBS MODERATE 35: CPT | Performed by: STUDENT IN AN ORGANIZED HEALTH CARE EDUCATION/TRAINING PROGRAM

## 2025-02-17 PROCEDURE — 85025 COMPLETE CBC W/AUTO DIFF WBC: CPT | Performed by: STUDENT IN AN ORGANIZED HEALTH CARE EDUCATION/TRAINING PROGRAM

## 2025-02-17 PROCEDURE — 25810000003 SODIUM CHLORIDE 0.9 % SOLUTION: Performed by: STUDENT IN AN ORGANIZED HEALTH CARE EDUCATION/TRAINING PROGRAM

## 2025-02-17 PROCEDURE — 25010000002 ENOXAPARIN PER 10 MG: Performed by: STUDENT IN AN ORGANIZED HEALTH CARE EDUCATION/TRAINING PROGRAM

## 2025-02-17 PROCEDURE — 25010000002 PROCHLORPERAZINE 10 MG/2ML SOLUTION: Performed by: STUDENT IN AN ORGANIZED HEALTH CARE EDUCATION/TRAINING PROGRAM

## 2025-02-17 PROCEDURE — 71045 X-RAY EXAM CHEST 1 VIEW: CPT

## 2025-02-17 PROCEDURE — 80048 BASIC METABOLIC PNL TOTAL CA: CPT | Performed by: STUDENT IN AN ORGANIZED HEALTH CARE EDUCATION/TRAINING PROGRAM

## 2025-02-17 RX ORDER — BISACODYL 10 MG
10 SUPPOSITORY, RECTAL RECTAL DAILY
Status: DISCONTINUED | OUTPATIENT
Start: 2025-02-17 | End: 2025-02-19 | Stop reason: HOSPADM

## 2025-02-17 RX ORDER — LIDOCAINE 4 G/G
2 PATCH TOPICAL
Status: DISCONTINUED | OUTPATIENT
Start: 2025-02-17 | End: 2025-02-19 | Stop reason: HOSPADM

## 2025-02-17 RX ADMIN — Medication 10 ML: at 20:06

## 2025-02-17 RX ADMIN — PROCHLORPERAZINE EDISYLATE 2.5 MG: 5 INJECTION INTRAMUSCULAR; INTRAVENOUS at 10:23

## 2025-02-17 RX ADMIN — PROCHLORPERAZINE EDISYLATE 2.5 MG: 5 INJECTION INTRAMUSCULAR; INTRAVENOUS at 23:45

## 2025-02-17 RX ADMIN — HYDROMORPHONE HYDROCHLORIDE 0.5 MG: 1 INJECTION, SOLUTION INTRAMUSCULAR; INTRAVENOUS; SUBCUTANEOUS at 23:45

## 2025-02-17 RX ADMIN — DICLOFENAC SODIUM 2 G: 10 GEL TOPICAL at 18:17

## 2025-02-17 RX ADMIN — SODIUM CHLORIDE 500 ML: 9 INJECTION, SOLUTION INTRAVENOUS at 11:51

## 2025-02-17 RX ADMIN — Medication 10 ML: at 08:29

## 2025-02-17 RX ADMIN — HYDROMORPHONE HYDROCHLORIDE 0.5 MG: 1 INJECTION, SOLUTION INTRAMUSCULAR; INTRAVENOUS; SUBCUTANEOUS at 06:41

## 2025-02-17 RX ADMIN — ENOXAPARIN SODIUM 30 MG: 30 INJECTION SUBCUTANEOUS at 08:28

## 2025-02-17 RX ADMIN — PROCHLORPERAZINE EDISYLATE 2.5 MG: 5 INJECTION INTRAMUSCULAR; INTRAVENOUS at 03:01

## 2025-02-17 RX ADMIN — PROCHLORPERAZINE EDISYLATE 2.5 MG: 5 INJECTION INTRAMUSCULAR; INTRAVENOUS at 16:20

## 2025-02-17 RX ADMIN — HYDROMORPHONE HYDROCHLORIDE 0.5 MG: 1 INJECTION, SOLUTION INTRAMUSCULAR; INTRAVENOUS; SUBCUTANEOUS at 16:20

## 2025-02-17 RX ADMIN — HYDROMORPHONE HYDROCHLORIDE 0.5 MG: 1 INJECTION, SOLUTION INTRAMUSCULAR; INTRAVENOUS; SUBCUTANEOUS at 12:53

## 2025-02-17 RX ADMIN — DICLOFENAC SODIUM 2 G: 10 GEL TOPICAL at 08:29

## 2025-02-17 RX ADMIN — ACETAMINOPHEN 650 MG: 325 TABLET ORAL at 08:29

## 2025-02-17 RX ADMIN — BISACODYL 10 MG: 10 SUPPOSITORY RECTAL at 08:29

## 2025-02-17 RX ADMIN — LIDOCAINE 2 PATCH: 4 PATCH TOPICAL at 10:23

## 2025-02-17 RX ADMIN — DICLOFENAC SODIUM 2 G: 10 GEL TOPICAL at 11:55

## 2025-02-17 RX ADMIN — HYDROMORPHONE HYDROCHLORIDE 0.5 MG: 1 INJECTION, SOLUTION INTRAMUSCULAR; INTRAVENOUS; SUBCUTANEOUS at 20:05

## 2025-02-17 RX ADMIN — PROCHLORPERAZINE EDISYLATE 2.5 MG: 5 INJECTION INTRAMUSCULAR; INTRAVENOUS at 20:04

## 2025-02-17 RX ADMIN — PROCHLORPERAZINE EDISYLATE 2.5 MG: 5 INJECTION INTRAMUSCULAR; INTRAVENOUS at 06:42

## 2025-02-17 RX ADMIN — HYDROMORPHONE HYDROCHLORIDE 0.5 MG: 1 INJECTION, SOLUTION INTRAMUSCULAR; INTRAVENOUS; SUBCUTANEOUS at 03:01

## 2025-02-17 NOTE — PROGRESS NOTES
[facility] Medicine Services  PROGRESS NOTE    Patient Name: Rebeca Mondragon  : 1940  MRN: [id number]    Date of Admission: 2025  Primary Care Physician: Lizeth Johnson MD    Subjective   Subjective     CC:  F/u bowel obstruction    HPI:  Slight fever overnight, had a small BM this AM      Objective   Objective     Vital Signs:   Temp:  [98.4 °F (36.9 °C)-100.5 °F (38.1 °C)] 99.1 °F (37.3 °C)  Heart Rate:  [100-111] 100  Resp:  [18] 18  BP: (137-152)/(67-72) 137/69     Physical Exam:  Constitutional: No acute distress, awake, alert  HENT: NCAT, mucous membranes moist  Respiratory: Clear to auscultation bilaterally, respiratory effort normal on RA  Cardiovascular: RRR, no murmurs, rubs, or gallops  Gastrointestinal: faint bowel sounds, NG to suction (this am)  Musculoskeletal: No bilateral ankle edema  Psychiatric: Appropriate affect, cooperative  Neurologic: Oriented x 3,no focal deficits  Skin: No rashes      Results Reviewed:  LAB RESULTS:      Lab 25  0400 25  1256 02/15/25  1716 02/15/25  1059 02/15/25  0415 25  2118   WBC 8.45 10.25  --   --  6.98 5.93   HEMOGLOBIN 9.0* 10.1*  --   --  10.1* 10.6*   HEMATOCRIT 29.0* 31.5*  --   --  32.1* 32.6*   PLATELETS 218 244  --   --  229 230   NEUTROS ABS 6.38 7.82*  --   --  6.23 4.36   IMMATURE GRANS (ABS) 0.04 0.06*  --   --  0.01 0.01   LYMPHS ABS 1.19 1.55  --   --  0.24* 0.99   MONOS ABS 0.74 0.77  --   --  0.48 0.51   EOS ABS 0.08 0.02  --   --  0.00 0.04   MCV 98.0* 96.9  --   --  97.6* 94.5   LACTATE  --   --  1.8 2.1* 2.6*  --          Lab 25  0400 25  1256 02/15/25  0415 25  2198   SODIUM 136 136 135* 132*   POTASSIUM 5.0 4.3 4.1 3.2*   CHLORIDE 102 102 102 98   CO2 26.0 25.0 24.0 21.6*   ANION GAP 8.0 9.0 9.0 12.4   BUN 9 10 18 19   CREATININE 0.84 0.82 0.90 1.09*   EGFR 69.0 71.1 63.6 50.5*   GLUCOSE 114* 111* 162* 122*   CALCIUM 8.8 8.8 8.3* 8.7   MAGNESIUM  --   --  1.7   --    PHOSPHORUS  --   --  3.6  --          Lab 02/15/25  0415 02/14/25  2118   TOTAL PROTEIN 4.9* 6.5   ALBUMIN 3.2* 3.7   GLOBULIN 1.7 2.8   ALT (SGPT) 296* <5   AST (SGOT) 511* 20   BILIRUBIN 0.2 <0.2   ALK PHOS 60 50   LIPASE  --  47                 Lab 02/16/25  1256   IRON 10*   IRON SATURATION (TSAT) 4*   TIBC 268*   TRANSFERRIN 180*   FERRITIN 229.50*   FOLATE 13.00   VITAMIN B 12 555         Brief Urine Lab Results  (Last result in the past 365 days)        Color   Clarity   Blood   Leuk Est   Nitrite   Protein   CREAT   Urine HCG        02/14/25 2134 Yellow   Clear   Negative   Negative   Negative   Negative                   Microbiology Results Abnormal       None            XR Chest 1 View    Result Date: 2/17/2025  XR CHEST 1 VW Date of Exam: 2/17/2025 9:33 AM EST Indication: post op fever Comparison: 2/14/2025 Findings: NG tube is seen in good position in the stomach. The heart and vasculature are normal in size. Lungs appear moderately well expanded and clear except for trace left basilar discoid atelectasis. Slight haziness of the left lateral chest is similar to the prior study and appears to be due to overlying breast tissue shadow. No pneumothorax, edema or effusion is seen. Incidental note is made of increasing gas in the included portion of the colon, loops still within normal limits in diameter.     Impression: Impression: Minimal left basilar discoid atelectasis. No new chest disease is seen elsewhere. Electronically Signed: Sourav Feliciano MD  2/17/2025 9:50 AM EST  Workstation ID: [id number]         Current medications:  Scheduled Meds:[Held by provider] aspirin, 81 mg, Oral, Daily  bisacodyl, 10 mg, Rectal, Daily  Diclofenac Sodium, 2 g, Topical, 4x Daily  enoxaparin, 30 mg, Subcutaneous, Daily  FLUoxetine, 40 mg, Oral, Daily  Lidocaine, 2 patch, Transdermal, Q24H  [Held by provider] lisinopril, 10 mg, Oral, Q24H  pantoprazole, 40 mg, Oral, Q AM  sodium chloride, 10 mL, Intravenous,  Q12H      Continuous Infusions:     PRN Meds:.  acetaminophen    Calcium Replacement - Follow Nurse / BPA Driven Protocol    hydrALAZINE    HYDROmorphone **AND** naloxone    Magnesium Standard Dose Replacement - Follow Nurse / BPA Driven Protocol    nitroglycerin    nitroglycerin    ondansetron ODT **OR** ondansetron    ondansetron ODT **OR** ondansetron    phenol    Phosphorus Replacement - Follow Nurse / BPA Driven Protocol    Potassium Replacement - Follow Nurse / BPA Driven Protocol    prochlorperazine    sodium chloride    sodium chloride    zolpidem    Assessment & Plan   Assessment & Plan     Active Hospital Problems    Diagnosis  POA    **Internal hernia [K45.8]  Yes      Resolved Hospital Problems   No resolved problems to display.        Brief Hospital Course to date:  Rebeca Mondragon is a 84 y.o. female who presented with generalized abdominal pain, she was found to have an internal hernia in the right central pelvis that resulted in a small bowel obstruction.  Case was discussed with on-call surgeon, Dr. Hi who took immediately to the OR.She was found to have multiple bands of adhesions in the right lower quadrant and pelvis serving as a lead point for volvulus of both the small intestine and cecum. This caused an internal hernia of small bowel which was hemorrhagic and partially ischemic with areas of patchy necrosis. This was resected to healthy bowel.      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 Hi  -Pain control  --tachycardic w fever overnight, given a bolus this AM. CXR was c/w atelectasis  -can dc NG tube and start clear liq diet now that she has had a BM  --encouraged ambulation and pulm toilet. Pt/ot       Hypertension  -resume PO meds now     Anxiety/depression  -cont prozac     Insomnia  Ambien PRN    Expected  Discharge Location and Transportation: home  Expected Discharge   Expected Discharge Date: 2/19/2025; Expected Discharge Time:      VTE Prophylaxis:  Pharmacologic & mechanical VTE prophylaxis orders are present.         AM-PAC 6 Clicks Score (PT): 20 (02/17/25 0808)    CODE STATUS:   Code Status and Medical Interventions: CPR (Attempt to Resuscitate); Full Support   Ordered at: 02/15/25 0218     Level Of Support Discussed With:    Patient     Code Status (Patient has no pulse and is not breathing):    CPR (Attempt to Resuscitate)     Medical Interventions (Patient has pulse or is breathing):    Full Support       Emely Kemp MD  02/17/25

## 2025-02-17 NOTE — CASE MANAGEMENT/SOCIAL WORK
Continued Stay Note  [facility]     Patient Name: Rebeca Mondragon  MRN: [id number]  Today's Date: 2/17/2025    Admit Date: 2/14/2025    Plan: home   Discharge Plan       Row Name 02/17/25 1418       Plan    Plan home    Patient/Family in Agreement with Plan yes    Plan Comments Met with Mrs. Mondragon at the bedside to discuss discharge plan. PT recommended home with assist, and OT recommended inpatient rehab. Mrs. Mondragon's plan is home with her , and he will transport.  will continue to follow plan of care and assist with discharge planning needs as indicated.    Final Discharge Disposition Code 01 - home or self-care                   Discharge Codes    No documentation.                 Expected Discharge Date and Time       Expected Discharge Date Expected Discharge Time    Feb 19, 2025               Kandi Alvarado RN

## 2025-02-17 NOTE — PROGRESS NOTES
"Patient Name:  Rebeca Mondragon  YOB: 1940  [id number]    Surgery Progress Note    Date of visit: 2/17/2025    Subjective   No acute events. Intermittent issues with pain control. Feels like bowels are moving but has not passed gas or BM yet. Ambulating with family and therapy.       Objective       /69 (BP Location: Left arm, Patient Position: Lying)   Pulse 100   Temp 99.1 °F (37.3 °C) (Oral)   Resp 18   Ht 160 cm (63\")   Wt 53.2 kg (117 lb 3.2 oz)   SpO2 97%   BMI 20.76 kg/m²     Intake/Output Summary (Last 24 hours) at 2/17/2025 1241  Last data filed at 2/17/2025 0642  Gross per 24 hour   Intake --   Output 1350 ml   Net -1350 ml       General: Alert, oriented x 3, well-appearing, no acute distress  Cardiovascular: regular rate and rhythm  Pulmonary: Breathing comfortably on nasal cannula, no respiratory distress  Abdomen: Soft, appropriately tender, non-distended, incisions are clean/dry/intact     Recent labs and imaging that are back at this time have been reviewed.     Labs:    Results from last 7 days   Lab Units 02/17/25  0400   WBC 10*3/mm3 8.45   HEMOGLOBIN g/dL 9.0*   HEMATOCRIT % 29.0*   PLATELETS 10*3/mm3 218     Results from last 7 days   Lab Units 02/17/25  0400 02/16/25  1256 02/15/25  0415   SODIUM mmol/L 136   < > 135*   POTASSIUM mmol/L 5.0   < > 4.1   CHLORIDE mmol/L 102   < > 102   CO2 mmol/L 26.0   < > 24.0   BUN mg/dL 9   < > 18   CREATININE mg/dL 0.84   < > 0.90   CALCIUM mg/dL 8.8   < > 8.3*   BILIRUBIN mg/dL  --   --  0.2   ALK PHOS U/L  --   --  60   ALT (SGPT) U/L  --   --  296*   AST (SGOT) U/L  --   --  511*   GLUCOSE mg/dL 114*   < > 162*    < > = values in this interval not displayed.     Results from last 7 days   Lab Units 02/17/25  0400   SODIUM mmol/L 136   POTASSIUM mmol/L 5.0   CHLORIDE mmol/L 102   CO2 mmol/L 26.0   BUN mg/dL 9   CREATININE mg/dL 0.84   GLUCOSE mg/dL 114*   CALCIUM mg/dL 8.8     Lab Results   Lab Value Date/Time    LIPASE 47 " 02/14/2025 2118            Assessment & Plan     Problem List Items Addressed This Visit       * (Principal) Internal hernia     Other Visit Diagnoses       Other partial intestinal obstruction    -  Primary    Hypokalemia        Bowel obstruction        Relevant Orders    Tissue Pathology Exam            Active Hospital Problems    Diagnosis  POA    **Internal hernia [K45.8]  Yes      Resolved Hospital Problems   No resolved problems to display.        Rebeca Mondragon is a 84 y.o. female presenting with internal herniation and resulting small bowel obstruction s/p exploratory laparotomy, lysis of adhesions ,reduction of internal hernia, and small bowel resection.     3 Days Post-Op    -NPO, NGT to LWS until having bowel function  -Okay to remove NGT and start clears if patient has flatus  -Okay for ice chips for comfort  -Pain: tylenol, dilaudid  -Reid: removed  -Antibiotics: not indicated  -PT/OT consults   Ambulate at least 4 times daily, IS multiple times q shift  -Bowel regimen  -Appreciate hospitalist assistance managing chronic medical conditions    -Activity: ad lauren and as tolerated except no heavy lifting > 30 lb x 4-6 weeks  -Wound Care: okay to shower, no baths/submerging incisions x 2 weeks  -Follow-up: likely two weeks s/p dc, pending discharge      Jose Francisco Hi MD  2/17/2025  12:41 EST

## 2025-02-18 LAB
ANION GAP SERPL CALCULATED.3IONS-SCNC: 12 MMOL/L (ref 5–15)
BASOPHILS # BLD AUTO: 0.03 10*3/MM3 (ref 0–0.2)
BASOPHILS NFR BLD AUTO: 0.5 % (ref 0–1.5)
BUN SERPL-MCNC: 10 MG/DL (ref 8–23)
BUN/CREAT SERPL: 14.3 (ref 7–25)
CALCIUM SPEC-SCNC: 9.1 MG/DL (ref 8.6–10.5)
CHLORIDE SERPL-SCNC: 98 MMOL/L (ref 98–107)
CO2 SERPL-SCNC: 23 MMOL/L (ref 22–29)
CREAT SERPL-MCNC: 0.7 MG/DL (ref 0.57–1)
CYTO UR: NORMAL
DEPRECATED RDW RBC AUTO: 46 FL (ref 37–54)
EGFRCR SERPLBLD CKD-EPI 2021: 85.9 ML/MIN/1.73
EOSINOPHIL # BLD AUTO: 0.09 10*3/MM3 (ref 0–0.4)
EOSINOPHIL NFR BLD AUTO: 1.5 % (ref 0.3–6.2)
ERYTHROCYTE [DISTWIDTH] IN BLOOD BY AUTOMATED COUNT: 13 % (ref 12.3–15.4)
GLUCOSE SERPL-MCNC: 94 MG/DL (ref 65–99)
HCT VFR BLD AUTO: 30.3 % (ref 34–46.6)
HGB BLD-MCNC: 9.6 G/DL (ref 12–15.9)
IMM GRANULOCYTES # BLD AUTO: 0.02 10*3/MM3 (ref 0–0.05)
IMM GRANULOCYTES NFR BLD AUTO: 0.3 % (ref 0–0.5)
LAB AP CASE REPORT: NORMAL
LAB AP CLINICAL INFORMATION: NORMAL
LYMPHOCYTES # BLD AUTO: 0.7 10*3/MM3 (ref 0.7–3.1)
LYMPHOCYTES NFR BLD AUTO: 11.9 % (ref 19.6–45.3)
MCH RBC QN AUTO: 30.3 PG (ref 26.6–33)
MCHC RBC AUTO-ENTMCNC: 31.7 G/DL (ref 31.5–35.7)
MCV RBC AUTO: 95.6 FL (ref 79–97)
MONOCYTES # BLD AUTO: 0.53 10*3/MM3 (ref 0.1–0.9)
MONOCYTES NFR BLD AUTO: 9 % (ref 5–12)
NEUTROPHILS NFR BLD AUTO: 4.5 10*3/MM3 (ref 1.7–7)
NEUTROPHILS NFR BLD AUTO: 76.8 % (ref 42.7–76)
NRBC BLD AUTO-RTO: 0 /100 WBC (ref 0–0.2)
PATH REPORT.FINAL DX SPEC: NORMAL
PATH REPORT.GROSS SPEC: NORMAL
PLATELET # BLD AUTO: 250 10*3/MM3 (ref 140–450)
PMV BLD AUTO: 9.7 FL (ref 6–12)
POTASSIUM SERPL-SCNC: 4.1 MMOL/L (ref 3.5–5.2)
QT INTERVAL: 424 MS
QTC INTERVAL: 477 MS
RBC # BLD AUTO: 3.17 10*6/MM3 (ref 3.77–5.28)
SODIUM SERPL-SCNC: 133 MMOL/L (ref 136–145)
WBC NRBC COR # BLD AUTO: 5.87 10*3/MM3 (ref 3.4–10.8)

## 2025-02-18 PROCEDURE — 97530 THERAPEUTIC ACTIVITIES: CPT

## 2025-02-18 PROCEDURE — 94799 UNLISTED PULMONARY SVC/PX: CPT

## 2025-02-18 PROCEDURE — 25010000002 KETOROLAC TROMETHAMINE PER 15 MG: Performed by: NURSE PRACTITIONER

## 2025-02-18 PROCEDURE — 25010000002 ENOXAPARIN PER 10 MG: Performed by: STUDENT IN AN ORGANIZED HEALTH CARE EDUCATION/TRAINING PROGRAM

## 2025-02-18 PROCEDURE — 94761 N-INVAS EAR/PLS OXIMETRY MLT: CPT

## 2025-02-18 PROCEDURE — 25010000002 NA FERRIC GLUC CPLX PER 12.5 MG: Performed by: NURSE PRACTITIONER

## 2025-02-18 PROCEDURE — 80048 BASIC METABOLIC PNL TOTAL CA: CPT | Performed by: STUDENT IN AN ORGANIZED HEALTH CARE EDUCATION/TRAINING PROGRAM

## 2025-02-18 PROCEDURE — 25010000002 HYDROMORPHONE PER 4 MG: Performed by: NURSE PRACTITIONER

## 2025-02-18 PROCEDURE — 99232 SBSQ HOSP IP/OBS MODERATE 35: CPT | Performed by: NURSE PRACTITIONER

## 2025-02-18 PROCEDURE — 85025 COMPLETE CBC W/AUTO DIFF WBC: CPT | Performed by: STUDENT IN AN ORGANIZED HEALTH CARE EDUCATION/TRAINING PROGRAM

## 2025-02-18 PROCEDURE — 25010000002 PROCHLORPERAZINE 10 MG/2ML SOLUTION: Performed by: STUDENT IN AN ORGANIZED HEALTH CARE EDUCATION/TRAINING PROGRAM

## 2025-02-18 PROCEDURE — 25010000002 HYDROMORPHONE PER 4 MG: Performed by: STUDENT IN AN ORGANIZED HEALTH CARE EDUCATION/TRAINING PROGRAM

## 2025-02-18 RX ORDER — NALOXONE HCL 0.4 MG/ML
0.4 VIAL (ML) INJECTION
Status: DISCONTINUED | OUTPATIENT
Start: 2025-02-18 | End: 2025-02-19 | Stop reason: HOSPADM

## 2025-02-18 RX ORDER — HYDROMORPHONE HYDROCHLORIDE 1 MG/ML
0.25 INJECTION, SOLUTION INTRAMUSCULAR; INTRAVENOUS; SUBCUTANEOUS EVERY 4 HOURS PRN
Status: DISCONTINUED | OUTPATIENT
Start: 2025-02-18 | End: 2025-02-19 | Stop reason: HOSPADM

## 2025-02-18 RX ORDER — HYDROCODONE BITARTRATE AND ACETAMINOPHEN 5; 325 MG/1; MG/1
0.5 TABLET ORAL EVERY 6 HOURS PRN
Status: DISCONTINUED | OUTPATIENT
Start: 2025-02-18 | End: 2025-02-19 | Stop reason: HOSPADM

## 2025-02-18 RX ORDER — HYDROMORPHONE HYDROCHLORIDE 1 MG/ML
0.5 INJECTION, SOLUTION INTRAMUSCULAR; INTRAVENOUS; SUBCUTANEOUS EVERY 4 HOURS PRN
Status: DISCONTINUED | OUTPATIENT
Start: 2025-02-18 | End: 2025-02-18

## 2025-02-18 RX ORDER — NALOXONE HCL 0.4 MG/ML
0.4 VIAL (ML) INJECTION
Status: DISCONTINUED | OUTPATIENT
Start: 2025-02-18 | End: 2025-02-18

## 2025-02-18 RX ORDER — KETOROLAC TROMETHAMINE 15 MG/ML
15 INJECTION, SOLUTION INTRAMUSCULAR; INTRAVENOUS EVERY 12 HOURS PRN
Status: DISCONTINUED | OUTPATIENT
Start: 2025-02-18 | End: 2025-02-19 | Stop reason: HOSPADM

## 2025-02-18 RX ADMIN — DICLOFENAC SODIUM 2 G: 10 GEL TOPICAL at 08:14

## 2025-02-18 RX ADMIN — DICLOFENAC SODIUM 2 G: 10 GEL TOPICAL at 17:01

## 2025-02-18 RX ADMIN — SODIUM CHLORIDE 62.5 MG: 9 INJECTION, SOLUTION INTRAVENOUS at 12:33

## 2025-02-18 RX ADMIN — PANTOPRAZOLE SODIUM 40 MG: 40 TABLET, DELAYED RELEASE ORAL at 05:29

## 2025-02-18 RX ADMIN — ZOLPIDEM TARTRATE 5 MG: 5 TABLET ORAL at 23:57

## 2025-02-18 RX ADMIN — PROCHLORPERAZINE EDISYLATE 2.5 MG: 5 INJECTION INTRAMUSCULAR; INTRAVENOUS at 04:17

## 2025-02-18 RX ADMIN — ENOXAPARIN SODIUM 30 MG: 30 INJECTION SUBCUTANEOUS at 08:13

## 2025-02-18 RX ADMIN — ASPIRIN 81 MG: 81 TABLET, COATED ORAL at 08:13

## 2025-02-18 RX ADMIN — LIDOCAINE 2 PATCH: 4 PATCH TOPICAL at 08:12

## 2025-02-18 RX ADMIN — PROCHLORPERAZINE EDISYLATE 2.5 MG: 5 INJECTION INTRAMUSCULAR; INTRAVENOUS at 20:56

## 2025-02-18 RX ADMIN — HYDROMORPHONE HYDROCHLORIDE 0.5 MG: 1 INJECTION, SOLUTION INTRAMUSCULAR; INTRAVENOUS; SUBCUTANEOUS at 08:13

## 2025-02-18 RX ADMIN — KETOROLAC TROMETHAMINE 15 MG: 15 INJECTION, SOLUTION INTRAMUSCULAR; INTRAVENOUS at 17:52

## 2025-02-18 RX ADMIN — LISINOPRIL 10 MG: 10 TABLET ORAL at 08:13

## 2025-02-18 RX ADMIN — Medication 10 ML: at 20:57

## 2025-02-18 RX ADMIN — Medication 10 ML: at 08:14

## 2025-02-18 RX ADMIN — PROCHLORPERAZINE EDISYLATE 2.5 MG: 5 INJECTION INTRAMUSCULAR; INTRAVENOUS at 08:14

## 2025-02-18 RX ADMIN — DICLOFENAC SODIUM 2 G: 10 GEL TOPICAL at 11:33

## 2025-02-18 RX ADMIN — HYDROMORPHONE HYDROCHLORIDE 0.25 MG: 1 INJECTION, SOLUTION INTRAMUSCULAR; INTRAVENOUS; SUBCUTANEOUS at 20:55

## 2025-02-18 RX ADMIN — FLUOXETINE HYDROCHLORIDE 40 MG: 20 CAPSULE ORAL at 08:12

## 2025-02-18 RX ADMIN — HYDROCODONE BITARTRATE AND ACETAMINOPHEN 0.5 TABLET: 5; 325 TABLET ORAL at 17:01

## 2025-02-18 RX ADMIN — HYDROMORPHONE HYDROCHLORIDE 0.5 MG: 1 INJECTION, SOLUTION INTRAMUSCULAR; INTRAVENOUS; SUBCUTANEOUS at 04:17

## 2025-02-18 NOTE — PLAN OF CARE
Goal Outcome Evaluation:  Plan of Care Reviewed With: patient, child        Progress: improving  Outcome Evaluation: Pt continues to present with decreased functional mobility and decreased activity tolerance compared to baseline. Pt ambulated 325ft with CGA and RW for support. Continue to progress per pt tolerance.    Anticipated Discharge Disposition (PT): home with assist

## 2025-02-18 NOTE — THERAPY TREATMENT NOTE
Patient Name: Rebeca Mondragon  : 1940    MRN: [id number]                              Today's Date: 2025       Admit Date: 2025    Visit Dx:     ICD-10-CM ICD-9-CM   1. Other partial intestinal obstruction  K56.690 560.89   2. Internal hernia  K45.8 553.8   3. Hypokalemia  E87.6 276.8   4. Bowel obstruction  K56.609 560.9     Patient Active Problem List   Diagnosis    Internal hernia     Past Medical History:   Diagnosis Date    Depression     Hypertension      Past Surgical History:   Procedure Laterality Date    APPENDECTOMY      CHOLECYSTECTOMY      EXPLORATORY LAPAROTOMY N/A 2025    Procedure: LAPAROTOMY EXPLORATORY WITH LYSIS OF ADHESIONS, SMALL BOWEL RESECTION;  Surgeon: Jose Francisco Hi MD;  Location: [facility];  Service: General;  Laterality: N/A;    JOINT REPLACEMENT      TUBAL ABDOMINAL LIGATION        General Information       Row Name 25 1321          Physical Therapy Time and Intention    Document Type therapy note (daily note)  -AE     Mode of Treatment physical therapy  -AE       Row Name 25 1321          General Information    Patient Profile Reviewed yes  -AE     Existing Precautions/Restrictions fall;other (see comments)  abdominal incision  -AE     Barriers to Rehab medically complex  -AE       Row Name 25 1321          Cognition    Orientation Status (Cognition) oriented x 4  -AE       Row Name 25 1321          Safety Issues/Impairments Affecting Functional Mobility    Safety Issues Affecting Function (Mobility) awareness of need for assistance;insight into deficits/self-awareness;safety precaution awareness;sequencing abilities  -AE     Impairments Affecting Function (Mobility) balance;endurance/activity tolerance;strength;shortness of breath  -AE               User Key  (r) = Recorded By, (t) = Taken By, (c) = Cosigned By      Initials Name Provider Type    AE Odell Bonilla PT Physical Therapist                   Mobility       Row Name  02/18/25 1322          Bed Mobility    Bed Mobility supine-sit;sit-supine  -AE     Supine-Sit Slope (Bed Mobility) contact guard;verbal cues  -AE     Sit-Supine Slope (Bed Mobility) contact guard;verbal cues  -AE     Assistive Device (Bed Mobility) bed rails;head of bed elevated  -AE     Comment, (Bed Mobility) VCs for hand placement and sequencing. Good use of bed rails to assist with supine to sit.  -AE       Row Name 02/18/25 1322          Transfers    Comment, (Transfers) VCs for hand placement and sequencing. Pt required increased cues to improve upright standing posture.  -AE       Row Name 02/18/25 1322          Sit-Stand Transfer    Sit-Stand Slope (Transfers) contact guard;verbal cues  -AE     Assistive Device (Sit-Stand Transfers) walker, front-wheeled  -AE       Row Name 02/18/25 1322          Gait/Stairs (Locomotion)    Slope Level (Gait) contact guard;verbal cues  -AE     Assistive Device (Gait) walker, front-wheeled  -AE     Distance in Feet (Gait) 325  -AE     Deviations/Abnormal Patterns (Gait) jose miguel decreased;stride length decreased;base of support, narrow;gait speed decreased  -AE     Bilateral Gait Deviations heel strike decreased;forward flexed posture  -AE     Comment, (Gait/Stairs) Pt demo step through gait pattern with slowed jose miguel, decreased gait speed, and forward flexed posture. Pt required increased cues to improve sequencing of AD as patient tends to have L path deviations while ambulating. Good effort given with mobility and improved strength/endurance compared to previous session. Further distance limited by fatigue.  -AE               User Key  (r) = Recorded By, (t) = Taken By, (c) = Cosigned By      Initials Name Provider Type    AE Odell Bonilla PT Physical Therapist                   Obj/Interventions       Row Name 02/18/25 1328          Balance    Balance Assessment sitting static balance;sitting dynamic balance;standing static balance;standing  dynamic balance  -AE     Static Sitting Balance standby assist  -AE     Dynamic Sitting Balance standby assist  -AE     Position, Sitting Balance unsupported;sitting edge of bed  -AE     Static Standing Balance contact guard  -AE     Dynamic Standing Balance contact guard  -AE     Position/Device Used, Standing Balance supported;walker, front-wheeled  -AE               User Key  (r) = Recorded By, (t) = Taken By, (c) = Cosigned By      Initials Name Provider Type    AE Odell Bonilla, PT Physical Therapist                   Goals/Plan    No documentation.                  Clinical Impression       Row Name 02/18/25 1414          Pain    Pain Location abdomen  -AE     Pain Side/Orientation generalized  -AE     Pain Management Interventions activity modification encouraged;exercise or physical activity utilized  -AE     Response to Pain Interventions activity participation with tolerable pain  -AE     Additional Documentation Pain Scale: FACES Pre/Post-Treatment (Group)  -AE       Row Name 02/18/25 1414          Pain Scale: FACES Pre/Post-Treatment    Pain: FACES Scale, Pretreatment 2-->hurts little bit  -AE     Posttreatment Pain Rating 2-->hurts little bit  -AE       Row Name 02/18/25 1414          Plan of Care Review    Plan of Care Reviewed With patient;child  -AE     Progress improving  -AE     Outcome Evaluation Pt continues to present with decreased functional mobility and decreased activity tolerance compared to baseline. Pt ambulated 325ft with CGA and RW for support. Continue to progress per pt tolerance.  -AE       Row Name 02/18/25 1414          Vital Signs    Pre Systolic BP Rehab 157  -AE     Pre Treatment Diastolic BP 80  -AE     Pretreatment Heart Rate (beats/min) 109  -AE     Posttreatment Heart Rate (beats/min) 114  -AE     Pre SpO2 (%) 95  -AE     O2 Delivery Pre Treatment room air  -AE     O2 Delivery Intra Treatment room air  -AE     Post SpO2 (%) 96  -AE     O2 Delivery Post Treatment room air   -AE     Pre Patient Position Supine  -AE     Intra Patient Position Standing  -AE     Post Patient Position Supine  -AE       Row Name 02/18/25 1414          Positioning and Restraints    Pre-Treatment Position in bed  -AE     Post Treatment Position bed  -AE     In Bed notified nsg;fowlers;call light within reach;encouraged to call for assist;with family/caregiver;side rails up x2  -AE               User Key  (r) = Recorded By, (t) = Taken By, (c) = Cosigned By      Initials Name Provider Type    Odell Ramirez PT Physical Therapist                   Outcome Measures       Row Name 02/18/25 1417 02/18/25 0800       How much help from another person do you currently need...    Turning from your back to your side while in flat bed without using bedrails? 3  -AE 4  -AB    Moving from lying on back to sitting on the side of a flat bed without bedrails? 3  -AE 3  -AB    Moving to and from a bed to a chair (including a wheelchair)? 3  -AE 3  -AB    Standing up from a chair using your arms (e.g., wheelchair, bedside chair)? 3  -AE 3  -AB    Climbing 3-5 steps with a railing? 3  -AE 3  -AB    To walk in hospital room? 3  -AE 3  -AB    AM-PAC 6 Clicks Score (PT) 18  -AE 19  -AB    Highest Level of Mobility Goal 6 --> Walk 10 steps or more  -AE 6 --> Walk 10 steps or more  -AB      Row Name 02/18/25 1417          Functional Assessment    Outcome Measure Options AM-PAC 6 Clicks Basic Mobility (PT)  -AE               User Key  (r) = Recorded By, (t) = Taken By, (c) = Cosigned By      Initials Name Provider Type    Odell Ramirez PT Physical Therapist    Evelina Belcher, RN Registered Nurse                                 Physical Therapy Education       Title: PT OT SLP Therapies (In Progress)       Topic: Physical Therapy (In Progress)       Point: Mobility training (In Progress)       Learning Progress Summary            Patient Acceptance, E, NR by THIERNO at 2/18/2025 1257    Acceptance, E, NR by MARLYS at 2/15/2025  1320                      Point: Home exercise program (In Progress)       Learning Progress Summary            Patient Acceptance, E, NR by AE at 2/18/2025 1257    Acceptance, E, NR by MARLYS at 2/15/2025 1320                      Point: Body mechanics (In Progress)       Learning Progress Summary            Patient Acceptance, E, NR by AE at 2/18/2025 1257    Acceptance, E, NR by MARLYS at 2/15/2025 1320                      Point: Precautions (In Progress)       Learning Progress Summary            Patient Acceptance, E, NR by AE at 2/18/2025 1257    Acceptance, E, NR by MARLYS at 2/15/2025 1320                                      User Key       Initials Effective Dates Name Provider Type Discipline    MARLYS 02/03/23 -  Yuli Guzmán PT Physical Therapist PT    AE 09/21/21 -  Odell Bonilla PT Physical Therapist PT                  PT Recommendation and Plan     Progress: improving  Outcome Evaluation: Pt continues to present with decreased functional mobility and decreased activity tolerance compared to baseline. Pt ambulated 325ft with CGA and RW for support. Continue to progress per pt tolerance.     Time Calculation:         PT Charges       Row Name 02/18/25 1418             Time Calculation    Start Time 1257  -AE      PT Received On 02/18/25  -AE      PT Goal Re-Cert Due Date 02/25/25  -AE         Timed Charges    94192 - PT Therapeutic Activity Minutes 14  -AE         Total Minutes    Timed Charges Total Minutes 14  -AE       Total Minutes 14  -AE                User Key  (r) = Recorded By, (t) = Taken By, (c) = Cosigned By      Initials Name Provider Type    AE Odell Bonilla PT Physical Therapist                  Therapy Charges for Today       Code Description Service Date Service Provider Modifiers Qty    64982266625 HC PT THERAPEUTIC ACT EA 15 MIN 2/18/2025 Odell Bonilla PT GP 1            PT G-Codes  Outcome Measure Options: AM-PAC 6 Clicks Basic Mobility (PT)  AM-PAC 6 Clicks Score (PT):  18  AM-PAC 6 Clicks Score (OT): 17  PT Discharge Summary  Anticipated Discharge Disposition (PT): home with assist    Odell Bonilla, PT  2/18/2025

## 2025-02-18 NOTE — PROGRESS NOTES
[facility] Medicine Services  PROGRESS NOTE    Patient Name: Rebeca Mondragon  : 1940  MRN: [id number]    Date of Admission: 2025  Primary Care Physician: Lizeth Johnson MD    Subjective   Subjective     CC:  F/u bowel obstruction     HPI:  Patient is resting in bed with daughter at bedside. No furhter flatus or bm since yesterday. Has been belching. Having arthritis pain all over. Denies any incisional pain       Objective   Objective     Vital Signs:   Temp:  [98.5 °F (36.9 °C)-99 °F (37.2 °C)] 98.5 °F (36.9 °C)  Heart Rate:  [100-113] 113  Resp:  [18] 18  BP: (148-163)/(69-81) 157/80     Physical Exam:  Constitutional: No acute distress, awake, alert, thin female   HENT: NCAT, mucous membranes moist  Respiratory: Clear to auscultation bilaterally, respiratory effort normal room air 97%   Cardiovascular: RRR, no murmurs, rubs, or gallops  Gastrointestinal: hypoactive  bowel sounds, soft, nontender, nondistended, vertical incision jake with staples  Musculoskeletal: No bilateral ankle edema  Psychiatric: Appropriate affect, cooperative  Neurologic: Oriented x 3, strength symmetric in all extremities,  speech clear  Skin: No rashes, pale       Results Reviewed:  LAB RESULTS:      Lab 25  0415 25  0400 25  1256 02/15/25  1716 02/15/25  1059 02/15/25  0415 25  2118   WBC 5.87 8.45 10.25  --   --  6.98 5.93   HEMOGLOBIN 9.6* 9.0* 10.1*  --   --  10.1* 10.6*   HEMATOCRIT 30.3* 29.0* 31.5*  --   --  32.1* 32.6*   PLATELETS 250 218 244  --   --  229 230   NEUTROS ABS 4.50 6.38 7.82*  --   --  6.23 4.36   IMMATURE GRANS (ABS) 0.02 0.04 0.06*  --   --  0.01 0.01   LYMPHS ABS 0.70 1.19 1.55  --   --  0.24* 0.99   MONOS ABS 0.53 0.74 0.77  --   --  0.48 0.51   EOS ABS 0.09 0.08 0.02  --   --  0.00 0.04   MCV 95.6 98.0* 96.9  --   --  97.6* 94.5   LACTATE  --   --   --  1.8 2.1* 2.6*  --          Lab 25  0415 25  0400 25  1256  02/15/25  0415 02/14/25 2118   SODIUM 133* 136 136 135* 132*   POTASSIUM 4.1 5.0 4.3 4.1 3.2*   CHLORIDE 98 102 102 102 98   CO2 23.0 26.0 25.0 24.0 21.6*   ANION GAP 12.0 8.0 9.0 9.0 12.4   BUN 10 9 10 18 19   CREATININE 0.70 0.84 0.82 0.90 1.09*   EGFR 85.9 69.0 71.1 63.6 50.5*   GLUCOSE 94 114* 111* 162* 122*   CALCIUM 9.1 8.8 8.8 8.3* 8.7   MAGNESIUM  --   --   --  1.7  --    PHOSPHORUS  --   --   --  3.6  --          Lab 02/15/25  0415 02/14/25 2118   TOTAL PROTEIN 4.9* 6.5   ALBUMIN 3.2* 3.7   GLOBULIN 1.7 2.8   ALT (SGPT) 296* <5   AST (SGOT) 511* 20   BILIRUBIN 0.2 <0.2   ALK PHOS 60 50   LIPASE  --  47                 Lab 02/16/25  1256   IRON 10*   IRON SATURATION (TSAT) 4*   TIBC 268*   TRANSFERRIN 180*   FERRITIN 229.50*   FOLATE 13.00   VITAMIN B 12 555         Brief Urine Lab Results  (Last result in the past 365 days)        Color   Clarity   Blood   Leuk Est   Nitrite   Protein   CREAT   Urine HCG        02/14/25 2134 Yellow   Clear   Negative   Negative   Negative   Negative                   Microbiology Results Abnormal       None            XR Chest 1 View    Result Date: 2/17/2025  XR CHEST 1 VW Date of Exam: 2/17/2025 9:33 AM EST Indication: post op fever Comparison: 2/14/2025 Findings: NG tube is seen in good position in the stomach. The heart and vasculature are normal in size. Lungs appear moderately well expanded and clear except for trace left basilar discoid atelectasis. Slight haziness of the left lateral chest is similar to the prior study and appears to be due to overlying breast tissue shadow. No pneumothorax, edema or effusion is seen. Incidental note is made of increasing gas in the included portion of the colon, loops still within normal limits in diameter.     Impression: Impression: Minimal left basilar discoid atelectasis. No new chest disease is seen elsewhere. Electronically Signed: Sourav Feliciano MD  2/17/2025 9:50 AM EST  Workstation ID: [id number]         Current  medications:  Scheduled Meds:aspirin, 81 mg, Oral, Daily  bisacodyl, 10 mg, Rectal, Daily  Diclofenac Sodium, 2 g, Topical, 4x Daily  enoxaparin, 30 mg, Subcutaneous, Daily  ferric gluconate, 62.5 mg, Intravenous, Once  FLUoxetine, 40 mg, Oral, Daily  Lidocaine, 2 patch, Transdermal, Q24H  lisinopril, 10 mg, Oral, Q24H  pantoprazole, 40 mg, Oral, Q AM  sodium chloride, 10 mL, Intravenous, Q12H      Continuous Infusions:   PRN Meds:.  acetaminophen    Calcium Replacement - Follow Nurse / BPA Driven Protocol    hydrALAZINE    HYDROcodone-acetaminophen    HYDROmorphone **AND** naloxone    ketorolac    Magnesium Standard Dose Replacement - Follow Nurse / BPA Driven Protocol    nitroglycerin    nitroglycerin    ondansetron ODT **OR** ondansetron    phenol    Phosphorus Replacement - Follow Nurse / BPA Driven Protocol    Potassium Replacement - Follow Nurse / BPA Driven Protocol    prochlorperazine    sodium chloride    sodium chloride    zolpidem    Assessment & Plan   Assessment & Plan     Active Hospital Problems    Diagnosis  POA    **Internal hernia [K45.8]  Yes      Resolved Hospital Problems   No resolved problems to display.        Brief Hospital Course to date:  Rebeca Mondragon is a 84 y.o. female who presented with generalized abdominal pain, she was found to have an internal hernia in the right central pelvis that resulted in a small bowel obstruction.  Case was discussed with on-call surgeon, Dr. Hi who took immediately to the OR.She was found to have multiple bands of adhesions in the right lower quadrant and pelvis serving as a lead point for volvulus of both the small intestine and cecum. This caused an internal hernia of small bowel which was hemorrhagic and partially ischemic with areas of patchy necrosis. This was resected to healthy bowel.     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 Hi  -Pain control  --tachycardic w fever 2/17, s/p fluid  bolus. CXR was c/w atelectasis  -can dc NG tube and start clear liq diet now that she has had a BM  --encouraged ambulation and pulm toilet. Pt/ot    Arthritis  -- kpad  -- voltaren cream, lidocaine patch, waffle mattress, prn toradol, prn lortab     Iron def anemia  -- hgb stable  -- iron 10, TSAT 4  -- give IV iron x 1         Hypertension  -resume PO meds now     Anxiety/depression  -cont prozac     Insomnia  Ambien PRN       Expected Discharge Location and Transportation: home   Expected Discharge   Expected Discharge Date: 2/19/2025; Expected Discharge Time:      VTE Prophylaxis:  Pharmacologic & mechanical VTE prophylaxis orders are present.         AM-PAC 6 Clicks Score (PT): 19 (02/18/25 0800)    CODE STATUS:   Code Status and Medical Interventions: CPR (Attempt to Resuscitate); Full Support   Ordered at: 02/15/25 0218     Level Of Support Discussed With:    Patient     Code Status (Patient has no pulse and is not breathing):    CPR (Attempt to Resuscitate)     Medical Interventions (Patient has pulse or is breathing):    Full Support       Estrellita Gallo, LAMAR  02/18/25

## 2025-02-18 NOTE — PROGRESS NOTES
"Patient Name:  Rebeca Mondragon  YOB: 1940  [id number]    Surgery Progress Note    Date of visit: 2/18/2025    Subjective   NGT removed yesterday as patient had multiple loose bowel movements. Did better overnight. Slightly nauseated this morning, but no emesis and controlled with medications. Ambulating without issues. Pain is controlled.        Objective       /80 (BP Location: Left arm, Patient Position: Lying)   Pulse 113   Temp 98.5 °F (36.9 °C) (Oral)   Resp 18   Ht 160 cm (63\")   Wt 55.9 kg (123 lb 4.8 oz)   SpO2 93%   BMI 21.84 kg/m²     Intake/Output Summary (Last 24 hours) at 2/18/2025 1124  Last data filed at 2/17/2025 1253  Gross per 24 hour   Intake --   Output 300 ml   Net -300 ml       General: Alert, oriented x 3, well-appearing, no acute distress  Cardiovascular: regular rate and rhythm  Pulmonary: Breathing comfortably on nasal cannula, no respiratory distress  Abdomen: Soft, appropriately tender, non-distended, incisions are clean/dry/intact     Recent labs and imaging that are back at this time have been reviewed.     Labs:    Results from last 7 days   Lab Units 02/18/25  0415   WBC 10*3/mm3 5.87   HEMOGLOBIN g/dL 9.6*   HEMATOCRIT % 30.3*   PLATELETS 10*3/mm3 250     Results from last 7 days   Lab Units 02/18/25  0415 02/16/25  1256 02/15/25  0415   SODIUM mmol/L 133*   < > 135*   POTASSIUM mmol/L 4.1   < > 4.1   CHLORIDE mmol/L 98   < > 102   CO2 mmol/L 23.0   < > 24.0   BUN mg/dL 10   < > 18   CREATININE mg/dL 0.70   < > 0.90   CALCIUM mg/dL 9.1   < > 8.3*   BILIRUBIN mg/dL  --   --  0.2   ALK PHOS U/L  --   --  60   ALT (SGPT) U/L  --   --  296*   AST (SGOT) U/L  --   --  511*   GLUCOSE mg/dL 94   < > 162*    < > = values in this interval not displayed.     Results from last 7 days   Lab Units 02/18/25  0415   SODIUM mmol/L 133*   POTASSIUM mmol/L 4.1   CHLORIDE mmol/L 98   CO2 mmol/L 23.0   BUN mg/dL 10   CREATININE mg/dL 0.70   GLUCOSE mg/dL 94   CALCIUM " mg/dL 9.1     Lab Results   Lab Value Date/Time    LIPASE 47 02/14/2025 2118            Assessment & Plan     Problem List Items Addressed This Visit       * (Principal) Internal hernia     Other Visit Diagnoses       Other partial intestinal obstruction    -  Primary    Hypokalemia        Bowel obstruction        Relevant Orders    Tissue Pathology Exam            Active Hospital Problems    Diagnosis  POA    **Internal hernia [K45.8]  Yes      Resolved Hospital Problems   No resolved problems to display.        Rebeca Mondragon is a 84 y.o. female presenting with internal herniation and resulting small bowel obstruction s/p exploratory laparotomy, lysis of adhesions ,reduction of internal hernia, and small bowel resection.     4 Days Post-Op    -Full liquid diet today  -Pain: tylenol, dilaudid  -PT/OT consults   Ambulate at least 4 times daily, IS multiple times q shift  -Bowel regimen  -Appreciate hospitalist assistance managing chronic medical conditions    -Activity: ad lauren and as tolerated except no heavy lifting > 30 lb x 4-6 weeks  -Wound Care: okay to shower, no baths/submerging incisions x 2 weeks  -Follow-up: likely two weeks s/p dc, pending discharge      Jose Francisco Hi MD  2/18/2025  11:24 EST

## 2025-02-18 NOTE — CASE MANAGEMENT/SOCIAL WORK
Continued Stay Note  [facility]     Patient Name: Rebeca Mondragon  MRN: [id number]  Today's Date: 2/18/2025    Admit Date: 2/14/2025    Plan: home   Discharge Plan       Row Name 02/18/25 0945       Plan    Plan home    Patient/Family in Agreement with Plan yes    Plan Comments Met with Ms. Mondragon at they bedside to discuss discharge plan. Her plan is home with her . No discharge needs were identified today.  will continue to follow plan of care and assist with discharge planning needs as indicated.    Final Discharge Disposition Code 01 - home or self-care                   Discharge Codes    No documentation.                 Expected Discharge Date and Time       Expected Discharge Date Expected Discharge Time    Feb 19, 2025               Kandi Alvarado RN

## 2025-02-19 VITALS
TEMPERATURE: 97.8 F | HEIGHT: 63 IN | OXYGEN SATURATION: 94 % | WEIGHT: 126.4 LBS | HEART RATE: 107 BPM | BODY MASS INDEX: 22.39 KG/M2 | DIASTOLIC BLOOD PRESSURE: 66 MMHG | RESPIRATION RATE: 16 BRPM | SYSTOLIC BLOOD PRESSURE: 134 MMHG

## 2025-02-19 PROCEDURE — 25010000002 ENOXAPARIN PER 10 MG: Performed by: STUDENT IN AN ORGANIZED HEALTH CARE EDUCATION/TRAINING PROGRAM

## 2025-02-19 PROCEDURE — 25010000002 KETOROLAC TROMETHAMINE PER 15 MG: Performed by: NURSE PRACTITIONER

## 2025-02-19 PROCEDURE — 99239 HOSP IP/OBS DSCHRG MGMT >30: CPT

## 2025-02-19 RX ORDER — HYDROCODONE BITARTRATE AND ACETAMINOPHEN 5; 325 MG/1; MG/1
0.5 TABLET ORAL EVERY 6 HOURS PRN
Qty: 12 TABLET | Refills: 0 | Status: SHIPPED | OUTPATIENT
Start: 2025-02-19

## 2025-02-19 RX ADMIN — ASPIRIN 81 MG: 81 TABLET, COATED ORAL at 09:05

## 2025-02-19 RX ADMIN — KETOROLAC TROMETHAMINE 15 MG: 15 INJECTION, SOLUTION INTRAMUSCULAR; INTRAVENOUS at 09:10

## 2025-02-19 RX ADMIN — ENOXAPARIN SODIUM 30 MG: 30 INJECTION SUBCUTANEOUS at 09:03

## 2025-02-19 RX ADMIN — LISINOPRIL 10 MG: 10 TABLET ORAL at 09:05

## 2025-02-19 RX ADMIN — PANTOPRAZOLE SODIUM 40 MG: 40 TABLET, DELAYED RELEASE ORAL at 05:15

## 2025-02-19 RX ADMIN — FLUOXETINE HYDROCHLORIDE 40 MG: 20 CAPSULE ORAL at 09:05

## 2025-02-19 RX ADMIN — Medication 10 ML: at 09:05

## 2025-02-19 NOTE — DISCHARGE INSTRUCTIONS
[facility]:  DISCHARGE INSTRUCTIONS  Dr. Jose Francisco Hi    DIET  Okay to resume a regular diet.      PAIN MANAGEMENT  Pain is normal after surgery, but should be able to be managed.     Recommend alternatin mg acetaminophen (Tylenol) every 6 hours*   600-800 mg ibuprofen (Motrin, Advil, etc.) every 6 hours  *Do not take more than 4000 mg of Tylenol in a single day.     If you received a prescription for pain medication after surgery, use this for breakthrough moderate or severe pain if not covered by acetaminophen or ibuprofen.  Okay to use warm and cool compresses.     Bowel Regimen  Prescribed pain medications may cause constipation. Any over-the-counter bowel regimen medications will help with these symptoms.     Recommended regimen:  Miralax 17g in zero sugar Gatorade/Powerade once daily  Senna laxative once to twice daily  Metamucil or other fiber supplement daily  If still constipated - milk of Magnesia or okay to try suppositories or enemas unless directed otherwise by surgeon      DISCHARGE ACTIVITY  Activity is as tolerated.   Okay to walk the night of surgery. Recommend walking at least 4 times daily to prevent complications  No excessive twisting/bending/lifting greater than 20 lbs for 2 weeks.  May return to more strenuous exercise as pain and lifting restrictions allow. Would avoid strenuous activity for at least 1-2 weeks to allow incision to heal.    Driving  You may not drive within 24 hour of receiving narcotic pain medication.   Okay to drive when not taking narcotic pain medication and your incision is not causing limitations with your reaction speed to traffic.    Return to work/school  You may return to work/school in 1-2 weeks with the above restrictions.  You may return to work/school without restrictions in 4 weeks or when your pain/activity allows.    WOUND CARE    Shower/Bathing  You may shower after 24 hours from the surgical procedure.   No tub baths, do not  submerge the incision underwater in a tub bath etc.      Wound Dressing  If dressed with adhesive glue, okay to follow shower/bathing instructions above. Keep site clean and dry.    If dressed with gauze bandages and steri strips. Okay to remove the outer bandage immediately after exiting your first shower and remove the Steri-Strips if still in place after 7 days.    Please call our office, [phone], if you develop increased pain, redness, yellow/purulent discharge, or fevers/chills as this may indicate an infection        FOLLOW-UP  You will be scheduled a follow-up appointment 1-2 weeks after discharge.   The [facility] will call you to schedule.    If you do not hear from anyone to schedule, please call [phone] to ask for an appointment 2 weeks from your surgery or discharge date.        CONCERNING SIGNS AND SYMPTOMS  1. Fevers/chills/flu-like symptoms  2. Increasing pain at the surgical site  3. Redness around incisions  4. Purulent drainage from incisions  5. Any other symptoms that are concerning to you    If you develop any of the signs or symptoms above, please call our office ([phone]) or after-hours line (). If unable to reach us, or symptoms are severe, please go to the ER for evaluation.

## 2025-02-19 NOTE — PROGRESS NOTES
"Patient Name:  Rebeca Mondragon  YOB: 1940  [id number]    Surgery Progress Note    Date of visit: 2/19/2025    Subjective   Feeling much better today. Only nauseated occasionally. Ambulating. Passing a lot of gas, no BM in a day. Tolerating full liquid diet.        Objective       /66   Pulse 107   Temp 97.8 °F (36.6 °C) (Oral)   Resp 16   Ht 160 cm (63\")   Wt 57.3 kg (126 lb 6.4 oz)   SpO2 94%   BMI 22.39 kg/m²     Intake/Output Summary (Last 24 hours) at 2/19/2025 1025  Last data filed at 2/19/2025 0800  Gross per 24 hour   Intake 240 ml   Output --   Net 240 ml       General: Alert, oriented x 3, well-appearing, no acute distress  Cardiovascular: regular rate and rhythm  Pulmonary: Breathing comfortably on nasal cannula, no respiratory distress  Abdomen: Soft, appropriately tender, non-distended, incisions are clean/dry/intact     Recent labs and imaging that are back at this time have been reviewed.     Labs:    Results from last 7 days   Lab Units 02/18/25  0415   WBC 10*3/mm3 5.87   HEMOGLOBIN g/dL 9.6*   HEMATOCRIT % 30.3*   PLATELETS 10*3/mm3 250     Results from last 7 days   Lab Units 02/18/25  0415 02/16/25  1256 02/15/25  0415   SODIUM mmol/L 133*   < > 135*   POTASSIUM mmol/L 4.1   < > 4.1   CHLORIDE mmol/L 98   < > 102   CO2 mmol/L 23.0   < > 24.0   BUN mg/dL 10   < > 18   CREATININE mg/dL 0.70   < > 0.90   CALCIUM mg/dL 9.1   < > 8.3*   BILIRUBIN mg/dL  --   --  0.2   ALK PHOS U/L  --   --  60   ALT (SGPT) U/L  --   --  296*   AST (SGOT) U/L  --   --  511*   GLUCOSE mg/dL 94   < > 162*    < > = values in this interval not displayed.     Results from last 7 days   Lab Units 02/18/25  0415   SODIUM mmol/L 133*   POTASSIUM mmol/L 4.1   CHLORIDE mmol/L 98   CO2 mmol/L 23.0   BUN mg/dL 10   CREATININE mg/dL 0.70   GLUCOSE mg/dL 94   CALCIUM mg/dL 9.1     Lab Results   Lab Value Date/Time    LIPASE 47 02/14/2025 2118            Assessment & Plan     Problem List Items " Addressed This Visit       * (Principal) Internal hernia     Other Visit Diagnoses       Other partial intestinal obstruction    -  Primary    Hypokalemia        Bowel obstruction        Relevant Orders    Tissue Pathology Exam            Active Hospital Problems    Diagnosis  POA    **Internal hernia [K45.8]  Yes      Resolved Hospital Problems   No resolved problems to display.        Rebeca Mondragon is a 84 y.o. female presenting with internal herniation and resulting small bowel obstruction s/p exploratory laparotomy, lysis of adhesions ,reduction of internal hernia, and small bowel resection.     5 Days Post-Op    -Regular diet  -Pain: tylenol, norco  -PT/OT consults   Ambulate at least 4 times daily, IS multiple times q shift  -Bowel regimen  -Appreciate hospitalist assistance managing chronic medical conditions  -Okay for discharge if patient doing well by lunch. Follow-up with me next week for likely staple removal    -Activity: ad lauren and as tolerated except no heavy lifting > 30 lb x 4-6 weeks  -Wound Care: okay to shower, no baths/submerging incisions x 2 weeks  -Follow-up: 1 week post-dc      Jose Francisco Hi MD  2/19/2025  10:25 EST

## 2025-02-19 NOTE — CASE MANAGEMENT/SOCIAL WORK
Continued Stay Note  [facility]     Patient Name: Rebeca Mondragon  MRN: [id number]  Today's Date: 2/19/2025    Admit Date: 2/14/2025    Plan: home with home health   Discharge Plan       Row Name 02/19/25 0945       Plan    Plan home with home health    Patient/Family in Agreement with Plan yes    Plan Comments Met with Ms. Mondragon at the bedside to discuss discharge plan. She is considering home versus home with home health. Informed her that her primary care provider made a referral to [facility]. She was agreeable.  left voice message with [facility] requesting call back.  will continue to follow plan of care and assist with discharge planning needs as indicated.    10:14 EST  Received call from [facility] liaison confirming that they will accept patient for skilled nursing at discharge.     Final Discharge Disposition Code 06 - home with home health care                   Discharge Codes    No documentation.                 Expected Discharge Date and Time       Expected Discharge Date Expected Discharge Time    Feb 19, 2025               Kandi Alvarado RN

## 2025-02-19 NOTE — DISCHARGE SUMMARY
[facility] Medicine Services  DISCHARGE SUMMARY    Patient Name: Rebeca Mondragon  : 1940  MRN: [id number]    Date of Admission: 2025  8:24 PM  Date of Discharge:  2025  Primary Care Physician: Lizeth Johnson MD    Consults       No orders found from 2025 to 2/15/2025.            Hospital Course     Presenting Problem: Bowel obstruction    Active Hospital Problems    Diagnosis  POA    **Internal hernia [K45.8]  Yes      Resolved Hospital Problems   No resolved problems to display.      Hospital Course:  Rebeca Mondragon is a 84 y.o. female who presented with generalized abdominal pain, she was found to have an internal hernia in the right central pelvis that resulted in a small bowel obstruction. Case was discussed with on-call surgeon, Dr. Hi who took immediately to the OR.She was found to have multiple bands of adhesions in the right lower quadrant and pelvis serving as a lead point for volvulus of both the small intestine and cecum. This caused an internal hernia of small bowel which was hemorrhagic and partially ischemic with areas of patchy necrosis. This was resected to healthy bowel.     Small bowel obstruction secondary to internal hernia due to adhesions  Volvulus around adhesions in the pelvis  Bowel ischemia  - S/p ex lap with lysis of adhesions and small bowel resection per Dr. Hi  - Pain control, limited Rx for Norco sent  - Tachycardic w fever , s/p fluid bolus. CXR was c/w atelectasis  - NG tube d/c'd , has now advanced to regular diet, tolerating well  - No lifting > 30 lbs x 4-6 wks, no baths/submerging incisions x 2 weeks per General Surgery instructions  - Follow up with Dr. Hi (General Surgery) in 1-2 weeks  - Follow up with PCP in 1 week for repeat CXR     Iron def anemia  - Hgb stable  - Iron 10, TSAT 4  - S/p IV iron x 1   - Continue home iron supplementation      Hypertension  Anxiety/depression  Insomnia  Arthritis    Discharge Follow Up Recommendations for outpatient labs/diagnostics:  - No lifting > 30 lbs x 4-6 wks, no baths/submerging incisions x 2 weeks per General Surgery instructions  - Follow up with Dr. Hi (General Surgery) in 1-2 weeks  - Follow up with PCP in 1 week for repeat CXR    Day of Discharge     HPI:   Patient states she feels well this morning. She has tolerated her full liquid diet, now advanced to regular diet. Patient tolerated lunch well per RN, deemed stable for discharge by General Surgery. Patient is agreeable to discharge. Follow up appointments and post-op directions reviewed with patient. Patient expressed understanding.     Review of Systems   Constitutional:  Negative for chills and fever.   Respiratory:  Negative for shortness of breath.    Cardiovascular:  Negative for chest pain.   Gastrointestinal:  Negative for abdominal pain, diarrhea, nausea and vomiting.       Vital Signs:   Temp:  [97.8 °F (36.6 °C)-98.4 °F (36.9 °C)] 97.8 °F (36.6 °C)  Heart Rate:  [] 107  Resp:  [16-18] 16  BP: (134-156)/(66-83) 134/66      Physical Exam:  Constitutional: Elderly appearing female lying in bed in NAD  HENT: NCAT, mucous membranes moist  Respiratory: Clear to auscultation bilaterally, nonlabored respirations on room air  Cardiovascular: RR, tachycardia, no murmurs  Gastrointestinal: Soft, nontender, nondistended  Musculoskeletal: No bilateral ankle edema  Psychiatric: Appropriate affect, cooperative  Neurologic: Alert and oriented, follows commands, speech clear  Skin: Abdominal incision and staples C/D/I    Pertinent  and/or Most Recent Results     LAB RESULTS:      Lab 02/18/25  0415 02/17/25  0400 02/16/25  1256 02/15/25  1716 02/15/25  1059 02/15/25  0415 02/14/25  2118   WBC 5.87 8.45 10.25  --   --  6.98 5.93   HEMOGLOBIN 9.6* 9.0* 10.1*  --   --  10.1* 10.6*   HEMATOCRIT 30.3* 29.0* 31.5*  --   --  32.1* 32.6*   PLATELETS 250 218  244  --   --  229 230   NEUTROS ABS 4.50 6.38 7.82*  --   --  6.23 4.36   IMMATURE GRANS (ABS) 0.02 0.04 0.06*  --   --  0.01 0.01   LYMPHS ABS 0.70 1.19 1.55  --   --  0.24* 0.99   MONOS ABS 0.53 0.74 0.77  --   --  0.48 0.51   EOS ABS 0.09 0.08 0.02  --   --  0.00 0.04   MCV 95.6 98.0* 96.9  --   --  97.6* 94.5   LACTATE  --   --   --  1.8 2.1* 2.6*  --          Lab 02/18/25  0415 02/17/25  0400 02/16/25  1256 02/15/25  0415 02/14/25  2118   SODIUM 133* 136 136 135* 132*   POTASSIUM 4.1 5.0 4.3 4.1 3.2*   CHLORIDE 98 102 102 102 98   CO2 23.0 26.0 25.0 24.0 21.6*   ANION GAP 12.0 8.0 9.0 9.0 12.4   BUN 10 9 10 18 19   CREATININE 0.70 0.84 0.82 0.90 1.09*   EGFR 85.9 69.0 71.1 63.6 50.5*   GLUCOSE 94 114* 111* 162* 122*   CALCIUM 9.1 8.8 8.8 8.3* 8.7   MAGNESIUM  --   --   --  1.7  --    PHOSPHORUS  --   --   --  3.6  --          Lab 02/15/25  0415 02/14/25 2118   TOTAL PROTEIN 4.9* 6.5   ALBUMIN 3.2* 3.7   GLOBULIN 1.7 2.8   ALT (SGPT) 296* <5   AST (SGOT) 511* 20   BILIRUBIN 0.2 <0.2   ALK PHOS 60 50   LIPASE  --  47                 Lab 02/16/25  1256   IRON 10*   IRON SATURATION (TSAT) 4*   TIBC 268*   TRANSFERRIN 180*   FERRITIN 229.50*   FOLATE 13.00   VITAMIN B 12 555         Brief Urine Lab Results  (Last result in the past 365 days)        Color   Clarity   Blood   Leuk Est   Nitrite   Protein   CREAT   Urine HCG        02/14/25 2134 Yellow   Clear   Negative   Negative   Negative   Negative                 Microbiology Results (last 10 days)       Procedure Component Value - Date/Time    COVID-19, FLU A/B, RSV PCR 1 HR TAT - Swab, Nasopharynx [[id number]]  (Normal) Collected: 02/14/25 2134    Lab Status: Final result Specimen: Swab from Nasopharynx Updated: 02/14/25 2216     COVID19 Not Detected     Influenza A PCR Not Detected     Influenza B PCR Not Detected     RSV, PCR Not Detected    Narrative:      Fact sheet for providers: https://www.fda.gov/media/102054/download    Fact sheet for patients:  https://www.fda.gov/media/074352/download    Test performed by PCR.            XR Chest 1 View    Result Date: 2/17/2025  XR CHEST 1 VW Date of Exam: 2/17/2025 9:33 AM EST Indication: post op fever Comparison: 2/14/2025 Findings: NG tube is seen in good position in the stomach. The heart and vasculature are normal in size. Lungs appear moderately well expanded and clear except for trace left basilar discoid atelectasis. Slight haziness of the left lateral chest is similar to the prior study and appears to be due to overlying breast tissue shadow. No pneumothorax, edema or effusion is seen. Incidental note is made of increasing gas in the included portion of the colon, loops still within normal limits in diameter.     Impression: Minimal left basilar discoid atelectasis. No new chest disease is seen elsewhere. Electronically Signed: Sourav Feliciano MD  2/17/2025 9:50 AM EST  Workstation ID: [id number]    XR Chest 1 View    Result Date: 2/14/2025  XR CHEST 1 VW Date of Exam: 2/14/2025 10:02 PM EST Indication: Presurgical evaluation, evaluate nasogastric tube placement. Comparison: 1/11/2018. Findings: The tip of the nasogastric tube terminates in the fundus of the stomach. The heart size is normal. The pulmonary vascular markings are normal. The lungs and pleural spaces are clear of active disease. There are chronic age-related changes involving the bony thorax and thoracic aorta.     Impression: 1.The tip of the nasogastric tube terminates in the fundus of the stomach. 2.No active pulmonary disease. Electronically Signed: Darnell Cleaning MD  2/14/2025 10:20 PM EST  Workstation ID: [id number]    CT Abdomen Pelvis With Contrast    Result Date: 2/14/2025  CT ABDOMEN PELVIS W CONTRAST Date of Exam: 2/14/2025 8:42 PM EST Indication: abd pain, diarrhea. Comparison: None available. Technique: Axial CT images were obtained of the abdomen and pelvis following the uneventful intravenous administration of 75 cc Isovue-300. Reconstructed  coronal and sagittal images were also obtained. Automated exposure control and iterative construction methods were used. Findings: LUNG BASES:  Unremarkable without mass or infiltrate. LIVER:  Unremarkable parenchyma without focal lesion. BILIARY/GALLBLADDER: Cholecystectomy SPLEEN:  Unremarkable PANCREAS:  Unremarkable ADRENAL:  Unremarkable KIDNEYS:  Unremarkable parenchyma with no solid mass identified. No obstruction.  No calculus identified. GASTROINTESTINAL/MESENTERY: There are prominent loops of distal small intestine measuring up to 2.3 cm in diameter with gas fluid levels. There is an abrupt tapering in the level of the right lower quadrant with adjacent tapering cecum (images 90-99, series 2). Imaging features are consistent with at least partial mechanical small bowel and cecal obstruction related to an internal hernia. There is mesenteric edema within the central pelvis. Intestinal tract is otherwise unremarkable. MESENTERIC VESSELS:  Patent. AORTA/IVC:  Normal caliber. RETROPERITONEUM/LYMPH NODES:  Unremarkable REPRODUCTIVE: Obscured by hip arthroplasty artifact but presumed hysterectomy. BLADDER:  Unremarkable OSSEUS STRUCTURES: There are bilateral total hip arthroplasties with associated streak and beam hardening artifact.     Impression: 1.There is an internal hernia within the right central upper pelvis resulting in at least partial distal small bowel obstruction. Associated mesenteric edema surrounding loops of intestine within the central pelvis. Electronically Signed: Amrit Cruz MD  2/14/2025 9:10 PM EST  Workstation ID: [id number]                 Plan for Follow-up of Pending Labs/Results:     Discharge Details        Discharge Medications        New Medications        Instructions Start Date   HYDROcodone-acetaminophen 5-325 MG per tablet  Commonly known as: NORCO   0.5 tablets, Oral, Every 6 Hours PRN             Continue These Medications        Instructions Start Date   aspirin 81 MG EC  tablet   81 mg, Daily      Bempedoic Acid-Ezetimibe 180-10 MG tablet   1 tablet, Oral, Daily      ferrous sulfate 324 (65 Fe) MG tablet delayed-release EC tablet   324 mg, Oral, Daily With Breakfast      FLUoxetine 40 MG capsule  Commonly known as: PROzac   1 capsule, Daily      lisinopril 10 MG tablet  Commonly known as: PRINIVIL,ZESTRIL   1 tablet, As Needed      omeprazole 40 MG capsule  Commonly known as: priLOSEC   40 mg, Daily      tiZANidine 4 MG tablet  Commonly known as: ZANAFLEX   1 tablet, Every 8 Hours PRN      zolpidem 10 MG tablet  Commonly known as: AMBIEN   10 mg, Nightly               Allergies   Allergen Reactions    Bactrim [Sulfamethoxazole-Trimethoprim] Unknown - Low Severity    Statins Unknown - Low Severity         Discharge Disposition:  Home or Self Care    Diet:  Hospital:  Diet Order   Procedures    Diet: Regular/House; Fluid Consistency: Thin (IDDSI 0)       Diet Instructions       Diet: Regular/House Diet; Regular (IDDSI 7); Thin (IDDSI 0)      Discharge Diet: Regular/House Diet    Texture: Regular (IDDSI 7)    Fluid Consistency: Thin (IDDSI 0)             Activity:  Activity Instructions       Activity as Tolerated      Bathing Restrictions      No baths/submerging incisions x 2 weeks    Type of Restriction: Bathing    Bathing Restrictions: No Tub Bath    Lifting Restrictions      Type of Restriction: Lifting    Lifting Restrictions: Other    Explain Lifing Restrictions: No lifting > 30lbs x 4-6 weeks            Restrictions or Other Recommendations: No lifting > 30 lbs x 4-6 wks, no baths/submerging incisions x 2 weeks per General Surgery instructions       CODE STATUS:    Code Status and Medical Interventions: CPR (Attempt to Resuscitate); Full Support   Ordered at: 02/15/25 0218     Level Of Support Discussed With:    Patient     Code Status (Patient has no pulse and is not breathing):    CPR (Attempt to Resuscitate)     Medical Interventions (Patient has pulse or is breathing):     Full Support       No future appointments.    Additional Instructions for the Follow-ups that You Need to Schedule       Ambulatory Referral to Home Health   As directed      Face to Face Visit Date: 2/19/2025   Follow-up provider for Plan of Care?: I treated the patient in an acute care facility and will not continue treatment after discharge.   Follow-up provider: LIZETH JOHNSON [[id number]]   Reason/Clinical Findings: Impaired functional mobility, endurance, balance, and gait. Post surgery care.   Describe mobility limitations that make leaving home difficult: Impaired functional mobility, endurance, balance, and gait. Post surgery care.   Nursing/Therapeutic Services Requested: Skilled Nursing   Skilled nursing orders: Medication education Cardiopulmonary assessments   Frequency: 1 Week 1        Discharge Follow-up with PCP   As directed       Currently Documented PCP:    Lizeth Johnson MD    PCP Phone Number:    [phone]     Follow Up Details: Follow up in 1 week for repeat CXR                      Nazia Gonzalez PA-C  02/19/25      Time Spent on Discharge:  I spent  32  minutes on this discharge activity which included: face-to-face encounter with the patient, reviewing the data in the system, coordination of the care with the nursing staff as well as consultants, documentation, and entering orders.

## 2025-02-20 ENCOUNTER — READMISSION MANAGEMENT (OUTPATIENT)
Dept: CALL CENTER | Facility: HOSPITAL | Age: 85
End: 2025-02-20
Payer: MEDICARE

## 2025-02-20 NOTE — OUTREACH NOTE
Prep Survey      Flowsheet Row Responses   [facility] facility patient discharged from? [place]   Is LACE score < 7 ? No   Eligibility Readm Mgmt   Discharge diagnosis Internal hernia, bowel resection   Does the patient have one of the following disease processes/diagnoses(primary or secondary)? General Surgery   Does the patient have Home health ordered? Yes   What is the Home health agency?  [facility]   Is there a DME ordered? No   Comments regarding appointments No lifting > 30 lbs x 4-6 wks, no baths/submerging incisions x 2 weeks per General Surgery instructions  - Follow up with Dr. Hi (General Surgery) in 1-2 weeks  - Follow up with PCP in 1 week for repeat CXR   Medication alerts for this patient see avs   Prep survey completed? Yes            Rosi CALDERON - Registered Nurse

## 2025-02-25 ENCOUNTER — READMISSION MANAGEMENT (OUTPATIENT)
Dept: CALL CENTER | Facility: HOSPITAL | Age: 85
End: 2025-02-25
Payer: MEDICARE

## 2025-02-25 NOTE — OUTREACH NOTE
General Surgery Week 1 Survey      Flowsheet Row Responses   [facility] patient discharged from? [place]   Does the patient have one of the following disease processes/diagnoses(primary or secondary)? General Surgery   Week 1 attempt successful? Yes   Call start time 1355   Call end time 1358   Discharge diagnosis S/p ex lap with lysis of adhesions and small bowel resection   Person spoke with today (if not patient) and relationship  and patient   Meds reviewed with patient/caregiver? Yes   Does the patient have all medications related to this admission filled (includes all antibiotics, pain medications, etc.) Yes   Is the patient taking all medications as directed (includes completed medication regime)? Yes   Does the patient have a follow up appointment scheduled with their surgeon? Yes  [2/27]   Has the patient kept scheduled appointments due by today? N/A   Comments PCP appt tomorrow 2/26   [facility] the [facility]   Has home health visited the patient within 72 hours of discharge? Yes   Psychosocial issues? No   Did the patient receive a copy of their discharge instructions? Yes   Nursing interventions Reviewed instructions with patient   What is the patient's perception of their health status since discharge? Improving   Is the patient /caregiver able to teach back basic post-op care? Keep incision areas clean,dry and protected   Is the patient/caregiver able to teach back signs and symptoms of incisional infection? Increased redness, swelling or pain at the incisonal site, Increased drainage or bleeding, Pus or odor from incision, Fever, Incisional warmth   Is the patient/caregiver able to teach back steps to recovery at home? Set small, achievable goals for return to baseline health, Rest and rebuild strength, gradually increase activity, Eat a well-balance diet   If the patient is a current smoker, are they able to teach back resources for cessation? Not a  smoker   Is the patient/caregiver able to teach back the hierarchy of who to call/visit for symptoms/problems? PCP, Specialist, Home health nurse, Urgent Care, ED, 911 Yes   Week 1 call completed? Yes   Is the patient interested in additional calls from an ambulatory ? No   Would this patient benefit from a Referral to [facility] Social Work? No   Call end time [phone]            DELTA DAILY - Registered Nurse

## 2025-03-07 ENCOUNTER — READMISSION MANAGEMENT (OUTPATIENT)
Dept: CALL CENTER | Facility: HOSPITAL | Age: 85
End: 2025-03-07
Payer: MEDICARE

## 2025-03-07 NOTE — OUTREACH NOTE
General Surgery Week 2 Survey      Flowsheet Row Responses   [facility] patient discharged from? [place]   Does the patient have one of the following disease processes/diagnoses(primary or secondary)? General Surgery   Week 2 attempt successful? Yes   Call start time 1406   Call end time 1407   Discharge diagnosis S/p ex lap with lysis of adhesions and small bowel resection   Person spoke with today (if not patient) and relationship  and patient   Meds reviewed with patient/caregiver? Yes   Does the patient have a follow up appointment scheduled with their surgeon? Yes   Comments Seen pcp since DC   What is the Home health agency?  [facility]   Has home health visited the patient within 72 hours of discharge? No   Home health comments HH wasnt needed anymore   Psychosocial issues? No   Did the patient receive a copy of their discharge instructions? Yes   Nursing interventions Reviewed instructions with patient   What is the patient's perception of their health status since discharge? Improving   Nursing interventions Nurse provided patient education   Is the patient/caregiver able to teach back signs and symptoms of incisional infection? Increased redness, swelling or pain at the incisonal site, Increased drainage or bleeding, Pus or odor from incision, Fever, Incisional warmth   Is the patient/caregiver able to teach back steps to recovery at home? Set small, achievable goals for return to baseline health, Rest and rebuild strength, gradually increase activity   Is the patient/caregiver able to teach back the hierarchy of who to call/visit for symptoms/problems? PCP, Specialist, Home health nurse, Urgent Care, ED, 911 Yes   Week 2 call completed? Yes   Graduated Yes   Wrap up additional comments Patient reports doing well no concerns or questions noted.   Call end time 1407            [facility] PRATER - Registered Nurse

## 2025-03-19 ENCOUNTER — HOSPITAL ENCOUNTER (OUTPATIENT)
Dept: GENERAL RADIOLOGY | Facility: HOSPITAL | Age: 85
Discharge: HOME | End: 2025-03-19
Admitting: INTERNAL MEDICINE
Payer: MEDICARE

## 2025-03-19 ENCOUNTER — TRANSCRIBE ORDERS (OUTPATIENT)
Dept: GENERAL RADIOLOGY | Facility: HOSPITAL | Age: 85
End: 2025-03-19
Payer: MEDICARE

## 2025-03-19 DIAGNOSIS — K59.09 CHRONIC CONSTIPATION: ICD-10-CM

## 2025-03-19 DIAGNOSIS — R93.89 ABNORMAL CHEST X-RAY: Primary | ICD-10-CM

## 2025-03-19 DIAGNOSIS — R93.89 ABNORMAL CHEST X-RAY: ICD-10-CM

## 2025-03-19 PROCEDURE — 71046 X-RAY EXAM CHEST 2 VIEWS: CPT

## 2025-03-19 PROCEDURE — 74019 RADEX ABDOMEN 2 VIEWS: CPT

## (undated) DEVICE — WOUND RETRACTOR AND PROTECTOR: Brand: ALEXIS O WOUND PROTECTOR-RETRACTOR

## (undated) DEVICE — SPNG LAP PREWSH SFTPK 18X18IN STRL PK/5

## (undated) DEVICE — 450 ML BOTTLE OF 0.05% CHLORHEXIDINE GLUCONATE IN 99.95% STERILE WATER FOR IRRIGATION, USP AND APPLICATOR.: Brand: IRRISEPT ANTIMICROBIAL WOUND LAVAGE

## (undated) DEVICE — DBD-DRAPE,LAP,CHOLE,W/TROUGHS,STERILE: Brand: MEDLINE

## (undated) DEVICE — SUT PDS CLOSURE CT1 1/0 27IN Z341H

## (undated) DEVICE — CLTH CLENS READYCLEANSE PERI CARE PK/5

## (undated) DEVICE — DRSNG WND BORDR/ADHS NONADHR/GZ LF 4X10IN STRL

## (undated) DEVICE — GOWN SURG ORBIS LVL3 2XL STRL

## (undated) DEVICE — DRAPE,UTILITY,TAPE,15X26,STERILE: Brand: MEDLINE

## (undated) DEVICE — TRAP FLD MINIVAC MEGADYNE 100ML

## (undated) DEVICE — SUT SILK 2/0 TIES 18IN A185H

## (undated) DEVICE — SUT SILK 3/0 SH CR8 18IN C013D

## (undated) DEVICE — SAFESECURE,SECUREMENT,FOLEY CATH,STERILE: Brand: MEDLINE

## (undated) DEVICE — PENCL SMOKE/EVAC MEGADYNE TELESCP 10FT

## (undated) DEVICE — ENSEAL 20 CM SHAFT, LARGE JAW: Brand: ENSEAL X1

## (undated) DEVICE — SUT SILK 3/0 TIES 18IN A184H

## (undated) DEVICE — YANKAUER,POOLE TIP,STERILE,50/CS: Brand: MEDLINE

## (undated) DEVICE — BLANKT WARM UPPR/BDY ARM/OUT 57X196CM

## (undated) DEVICE — LEX BASIC NO DRAPE: Brand: MEDLINE INDUSTRIES, INC.

## (undated) DEVICE — SUT PDS LP 1 TP1 96IN VIO PDP880GA

## (undated) DEVICE — UNDRGLV SURG BIOGEL PUNCTUREINDICATION SZ7 PF STRL

## (undated) DEVICE — GLV SURG BIOGEL LTX PF 7

## (undated) DEVICE — PATIENT RETURN ELECTRODE, SINGLE-USE, CONTACT QUALITY MONITORING, ADULT, WITH 9FT CORD, FOR PATIENTS WEIGING OVER 33LBS. (15KG): Brand: MEGADYNE